# Patient Record
Sex: FEMALE | Race: BLACK OR AFRICAN AMERICAN | Employment: FULL TIME | URBAN - METROPOLITAN AREA
[De-identification: names, ages, dates, MRNs, and addresses within clinical notes are randomized per-mention and may not be internally consistent; named-entity substitution may affect disease eponyms.]

---

## 2021-02-04 ENCOUNTER — OFFICE VISIT (OUTPATIENT)
Dept: SURGERY | Age: 41
End: 2021-02-04
Payer: COMMERCIAL

## 2021-02-04 VITALS
TEMPERATURE: 97.7 F | SYSTOLIC BLOOD PRESSURE: 134 MMHG | WEIGHT: 293 LBS | OXYGEN SATURATION: 100 % | BODY MASS INDEX: 47.09 KG/M2 | HEART RATE: 97 BPM | HEIGHT: 66 IN | DIASTOLIC BLOOD PRESSURE: 85 MMHG

## 2021-02-04 DIAGNOSIS — E66.01 MORBID OBESITY WITH BMI OF 50.0-59.9, ADULT (HCC): Primary | ICD-10-CM

## 2021-02-04 PROCEDURE — 99205 OFFICE O/P NEW HI 60 MIN: CPT | Performed by: SURGERY

## 2021-02-04 RX ORDER — TRAZODONE HYDROCHLORIDE 100 MG/1
TABLET ORAL
COMMUNITY
Start: 2020-11-22

## 2021-02-04 RX ORDER — LINACLOTIDE 145 UG/1
CAPSULE, GELATIN COATED ORAL
COMMUNITY
Start: 2020-12-03 | End: 2021-08-05

## 2021-02-04 RX ORDER — ARIPIPRAZOLE 15 MG/1
TABLET ORAL
COMMUNITY
Start: 2021-02-01

## 2021-02-04 NOTE — PROGRESS NOTES
Consult    Patient: Steven Jin MRN: [id number]  SSN: [id number]    YOB: 1980  Age: 41 y.o. Sex: female      Initial  Consultation for Bariatric Surgery     Steven Jin is a 73-year-old black female who presents for discussion of surgical options available for the definitive management of her super obesity. Onset obesity: Childhood  Weight 16: 170 pounds and a 5 foot 6 inch frame. Maximum/current weight: 325 pounds and a 5 feet 6 inches frame with a body mass index of 52. Pattern/progression of weight gain: Slowly progressive interrupted by dietary weight loss followed by regain of lost weight as well as additional weight thus exhibiting the yoyo effect of her current maximum weight of 325 pounds. Max medical weight loss attempts: Multiple unsupervised and supervised weight loss trials with a maximal loss occurring in  losing 30 pounds over 8 months  Comorbidities: Stress urinary incontinence, CPAP dependent obstructive sleep apnea, with weight related arthropathy-knees, ankles  Current weight 325 BMI: 52  Ideal body weight: 137  Excess body weight: 188  Estimated postsurgical weight loss: 150  Estimated postsurgical goal weight: 175  Allergies: No known drug allergies  Current medications: See medication list  Past medical history:  1. Super obesity with body mass index of 52 and obesity comorbidities of stress urinary incontinence, CPAP and obstructive sleep apnea, weight related arthropathy-knees, ankles  2. Bipolar disease  Past surgical history:  1.  section   2. Bilateral breast reduction mammoplasty   Social history:  Tobacco: None  Alcohol: 1 to 2 ounces on a monthly basis  Family history:   Mother 60-clinically severe obesity with adult onset diabetes mellitus and hypertension  Father 62-hypertension  Brother 38-healthy    Not on File    Current Outpatient Medications on File Prior to Visit   Medication Sig Dispense Refill    venlafaxine-SR (EFFEXOR-XR) 150 mg capsule TAKE 2 CAPSULES BY MOUTH EVERY DAY IN THE MORNING      ARIPiprazole (ABILIFY) 15 mg tablet       Linzess 145 mcg cap capsule TAKE 1 CAPSULE BY MOUTH ONCE A DAY BEFORE MEALS      traZODone (DESYREL) 100 mg tablet TAKE 2 TABLET BY MOUTH EVERY NIGHT AS NEEDED FOR SLEEP. No current facility-administered medications on file prior to visit. Past Medical History:   Diagnosis Date    Depression     Fibroids     IBS (irritable bowel syndrome)     Sleep apnea        History reviewed. No pertinent surgical history. Social History     Tobacco Use    Smoking status: Never Smoker    Smokeless tobacco: Never Used   Substance Use Topics    Alcohol use: Yes     Comment: socially    Drug use: Never       Family History   Problem Relation Age of Onset    Diabetes Mother     Hypertension Mother     Hypertension Father          Review of Systems:      General: Denies fevers, chills, night sweats, fatigue, weight loss, or weight gain.     HEENT: Denies changes in auditory or visual acuity, recurrent pharyngitis, epistaxis, chronic rhinorrhea, vertigo    Respiratory: Denies increasing shortness of breath, productive cough, hemoptysis    Cardiac: Denies known history of cardiac disease, heart murmur, palpitations    GI: Denies dysphagia, recurrent emesis, hematemesis, changes in bowel habits, hematochezia, melena    : Denies hematuria frequency urgency dysuria    Musculoskeletal: Denies fractures, dislocations    Neurologic: Denies history of CVA, paralysis paresthesias, recurrent cephalgia, seizures    Endocrine: Denies polyuria, polydipsia, polyphagia, heat and cold intolerance    Lymph/heme: Denies a history of malignancy, anemia, bruising, blood transfusions    Integumentary: Negative for dermatitis         Physical Exam    Visit Vitals  /85 (BP 1 Location: Right upper arm, BP Patient Position: Sitting)   Pulse 97   Temp 97.7 °F (36.5 °C)   Ht 5' 6\" (1.676 m)   Wt 147.4 kg (325 lb)   LMP 02/01/2021 (Exact Date)   SpO2 100%   BMI 52.46 kg/m²       Nursing note reviewed. General: Clinically severely obese in no acute distress, nontoxic in appearance. Head: Normocephalic, atraumatic  Mouth: Clear, no overt lesions, oral mucosa is pink and moist.  Neck: Supple, no masses, no adenopathy or carotid bruits, trachea midline  Resp: Clear to auscultation bilaterally, no wheezing, rhonchi, or rales, excursions normal and symmetrical.  Cardio: Regular rate and rhythm, no murmurs, clicks, gallops, or rubs. Abdomen: Obese, soft, nontender, nondistended, normoactive bowel sounds, no hernias. Extremities: Warm, well perfused, no tenderness or swelling, normal gait/station, without edema or varicosities  Neuro: Sensation and strength grossly intact and symmetrical.  Psych: Alert and oriented to person, place, and time. Impression/Plan:      68-year-old black female with a body mass index of 52 and obesity related comorbidities of stress incontinence, CPAP treatment obstructive sleep apnea, weight related arthropathy-knees, ankles who would benefit from bariatric surgery. We have had an extensive discussion with regard to the risks, benefits and likely outcomes of the operation. We've discussed the restrictive and malabsorptive nature of the gastric bypass and compared and contrasted with the sleeve gastrectomy. The patient understands the likelihood of losing approximately 80% of their excess weight in 12 to 18 months. The patient also understands the risks including but not limited to bleeding, infection, need for reoperation, ulcers, leaks and strictures, bowel obstruction secondary to adhesions and internal hernias, DVT, PE, heart attack, stroke, and death. Patient also understands risks of inadequate weight loss, excess weight loss, vitamin insufficiency, protein malnutrition, excess skin, and loss of hair.   We have reviewed the components of a successful postoperative course including requirement for a high protein, low carbohydrate diet, 60 oz a day of zero calorie liquids, daily vitamin supplementation, daily exercise, regular follow-up, and participation in support groups.  At this time we will enroll the patient in our bariatric program, undertake routine laboratory evaluation, chest X-ray, EKG, possible UGI and evaluation by  nutritionist as well as psychologist and pending their satisfactory completion of the preop evaluation, plan to pursue laparoscopic potentially open gastric bypass to achieve definitive durable weight loss on a personal level with expected resolution of obesity related comorbidities

## 2021-02-04 NOTE — PROGRESS NOTES
Og Bowling is a 41 y.o. female (: 1980) presenting to address:    Chief Complaint   Patient presents with   [address] [facility] Patient     GBP Consult       Medication list and allergies have been reviewed with Og Bowling and updated as of today's date. I have gone over all Medical, Surgical and Social History with Og Bowling and updated/added the information accordingly.

## 2021-02-08 ENCOUNTER — HOSPITAL ENCOUNTER (OUTPATIENT)
Dept: BARIATRICS/WEIGHT MGMT | Age: 41
Discharge: HOME | End: 2021-02-08

## 2021-02-08 NOTE — PROGRESS NOTES
[facility]  [facility], [address]    Patient's Name: Brandi Whaley   Age: 41 y.o. YOB: 1980   Sex: female    Date:   2/8/2021    Session: 1 of  6  Surgeon:  Negra Sanders    Height: 66\" Weight:    326  Lbs   BMI: 52.6    Starting Weight: 325 lbs     Overall Pounds Lost: 0   Overall Pounds Gained: 1      Do you smoke? Patient does not smoke    Alcohol intake: yes  Number of drinks at a time:  1  Number of times a week: 0x/week    Class Guidelines    Guidelines are reviewed with patient at the start of every class. 1. Patient understands that weight loss trial classes must be consecutive. Patient understands if they miss a class, it is their responsibility to contact me to reschedule class. I will reach out to patient after their first no show. 2.  Patient understands the expectations that weight maintenance/weight loss is expected during the classes. Failure to demonstrate changes may result in one extra month of weight loss trial, followed by going back to see the surgeon. 3. Patient is also instructed to be doing their labs, blood work, psych visit, support group and any other test that the surgeon has used while they are working on their weight loss trial.    Changes Made: Drinking more water and focusing on 75 g of carbs and exercise      Dietary Instruction    During today's class we continued to focus on the key diet principles. Patient was instructed to follow a low carbohydrate diet, focusing on meat and vegetables. Patient was instructed to stop liquid calories and aim for 64 ounces of water per day. We focused on focusing in on bigger problem areas to start making changes to, such as reducing fast food intake, reducing carbonated beverages/soda intake, decreasing carbohydrates intake daily, etc. We reviewed protein shakes and high protein yogurts to chose, as well.  Patient was educated on low sugar swaps for beverages, as well as low carb swaps for every day high carb meals. Patient's diet habits include: Eating 3-4 meals/day, consisting of protein and vegetables. Carb source comes from starchy veggies and yogurt or fruit. Snacking 2x/day on eggs. Struggling to let go of sweet tea. Drinking 64 oz of water daily. Physical Activity/Exercise    Comments:     Currently for exercise, patient is walking or going to the gym 4x/week for 30 minutes. We talked about activities for patient to do, including walking, swimming, or chair exercises, as well as some additional steps to take in the day to get extra movement, such as parking further away, walking dog, taking stairs vs elevator, etc.        Behavior Modification  Comments:   During class, I reviewed a power point with patients called, \"How to Read a Nutrition Label and Understanding Carbohydrates. \" During this power point, the patient was educated on reading nutrition labels and ingredients list and how to find low sugar/low fat products when shopping, as well as recognizing different sources of carbohydrates in foods. The patient was encouraged to try low carb food swaps that were discussed in the power point. We explored various ways of preparing and cooking low carb meals to help reach the goal of decreasing daily carbohydrate consumption. Patient's S.M.A.R.T. Goals are:  1. Aim for 75 g of carbs daily  2. Work out with [employer]  3.  Find alternatives to move away from eating when bored      Jos Clay, PARVEZ  2/8/2021

## 2021-02-16 ENCOUNTER — HOSPITAL ENCOUNTER (OUTPATIENT)
Dept: GENERAL RADIOLOGY | Age: 41
Discharge: HOME | End: 2021-02-16
Payer: COMMERCIAL

## 2021-02-16 ENCOUNTER — HOSPITAL ENCOUNTER (OUTPATIENT)
Dept: PREADMISSION TESTING | Age: 41
Discharge: HOME | End: 2021-02-16
Payer: COMMERCIAL

## 2021-02-16 ENCOUNTER — TRANSCRIBE ORDER (OUTPATIENT)
Dept: REGISTRATION | Age: 41
End: 2021-02-16

## 2021-02-16 ENCOUNTER — HOSPITAL ENCOUNTER (OUTPATIENT)
Dept: LAB | Age: 41
Discharge: HOME | End: 2021-02-16
Payer: COMMERCIAL

## 2021-02-16 DIAGNOSIS — E66.01 MORBID OBESITY (HCC): Primary | ICD-10-CM

## 2021-02-16 DIAGNOSIS — E66.01 MORBID OBESITY WITH BMI OF 50.0-59.9, ADULT (HCC): ICD-10-CM

## 2021-02-16 DIAGNOSIS — E66.01 MORBID OBESITY (HCC): ICD-10-CM

## 2021-02-16 LAB
25(OH)D3 SERPL-MCNC: 23.4 NG/ML (ref 30–100)
ALBUMIN SERPL-MCNC: 3.2 G/DL (ref 3.4–5)
ALBUMIN/GLOB SERPL: 0.7 {RATIO} (ref 0.8–1.7)
ALP SERPL-CCNC: 88 U/L (ref 45–117)
ALT SERPL-CCNC: 53 U/L (ref 13–56)
AMORPH CRY URNS QL MICRO: ABNORMAL
ANION GAP SERPL CALC-SCNC: 3 MMOL/L (ref 3–18)
APPEARANCE UR: ABNORMAL
AST SERPL-CCNC: 35 U/L (ref 10–38)
BACTERIA URNS QL MICRO: NEGATIVE /HPF
BILIRUB SERPL-MCNC: 0.3 MG/DL (ref 0.2–1)
BILIRUB UR QL: NEGATIVE
BUN SERPL-MCNC: 10 MG/DL (ref 7–18)
BUN/CREAT SERPL: 12 (ref 12–20)
CALCIUM SERPL-MCNC: 8.8 MG/DL (ref 8.5–10.1)
CHLORIDE SERPL-SCNC: 104 MMOL/L (ref 100–111)
CHOLEST SERPL-MCNC: 158 MG/DL
CO2 SERPL-SCNC: 29 MMOL/L (ref 21–32)
COLOR UR: YELLOW
CREAT SERPL-MCNC: 0.84 MG/DL (ref 0.6–1.3)
EPITH CASTS URNS QL MICRO: ABNORMAL /LPF (ref 0–5)
ERYTHROCYTE [DISTWIDTH] IN BLOOD BY AUTOMATED COUNT: 20.1 % (ref 11.6–14.5)
FOLATE SERPL-MCNC: 13.9 NG/ML (ref 3.1–17.5)
GLOBULIN SER CALC-MCNC: 4.8 G/DL (ref 2–4)
GLUCOSE SERPL-MCNC: 90 MG/DL (ref 74–99)
GLUCOSE UR STRIP.AUTO-MCNC: NEGATIVE MG/DL
HCT VFR BLD AUTO: 28.2 % (ref 35–45)
HDLC SERPL-MCNC: 45 MG/DL (ref 40–60)
HDLC SERPL: 3.5 {RATIO} (ref 0–5)
HGB BLD-MCNC: 7.4 G/DL (ref 12–16)
HGB UR QL STRIP: NEGATIVE
IRON SERPL-MCNC: 16 UG/DL (ref 50–175)
KETONES UR QL STRIP.AUTO: NEGATIVE MG/DL
LDLC SERPL CALC-MCNC: 90.2 MG/DL (ref 0–100)
LEUKOCYTE ESTERASE UR QL STRIP.AUTO: NEGATIVE
LIPID PROFILE,FLP: NORMAL
MCH RBC QN AUTO: 17.1 PG (ref 24–34)
MCHC RBC AUTO-ENTMCNC: 26.2 G/DL (ref 31–37)
MCV RBC AUTO: 65 FL (ref 74–97)
NITRITE UR QL STRIP.AUTO: NEGATIVE
PH UR STRIP: 5.5 [PH] (ref 5–8)
PLATELET # BLD AUTO: 317 K/UL (ref 135–420)
POTASSIUM SERPL-SCNC: 4.2 MMOL/L (ref 3.5–5.5)
PROT SERPL-MCNC: 8 G/DL (ref 6.4–8.2)
PROT UR STRIP-MCNC: NEGATIVE MG/DL
RBC # BLD AUTO: 4.34 M/UL (ref 4.2–5.3)
RBC #/AREA URNS HPF: NEGATIVE /HPF (ref 0–5)
SODIUM SERPL-SCNC: 136 MMOL/L (ref 136–145)
SP GR UR REFRACTOMETRY: 1.02 (ref 1–1.03)
TRIGL SERPL-MCNC: 114 MG/DL (ref ?–150)
TSH SERPL DL<=0.05 MIU/L-ACNC: 5.51 UIU/ML (ref 0.36–3.74)
UROBILINOGEN UR QL STRIP.AUTO: 0.2 EU/DL (ref 0.2–1)
VIT B12 SERPL-MCNC: 534 PG/ML (ref 211–911)
VLDLC SERPL CALC-MCNC: 22.8 MG/DL
WBC # BLD AUTO: 6.5 K/UL (ref 4.6–13.2)
WBC URNS QL MICRO: ABNORMAL /HPF (ref 0–4)

## 2021-02-16 PROCEDURE — 85027 COMPLETE CBC AUTOMATED: CPT

## 2021-02-16 PROCEDURE — 80061 LIPID PANEL: CPT

## 2021-02-16 PROCEDURE — 82306 VITAMIN D 25 HYDROXY: CPT

## 2021-02-16 PROCEDURE — 82607 VITAMIN B-12: CPT

## 2021-02-16 PROCEDURE — 36415 COLL VENOUS BLD VENIPUNCTURE: CPT

## 2021-02-16 PROCEDURE — 84425 ASSAY OF VITAMIN B-1: CPT

## 2021-02-16 PROCEDURE — 81001 URINALYSIS AUTO W/SCOPE: CPT

## 2021-02-16 PROCEDURE — 71046 X-RAY EXAM CHEST 2 VIEWS: CPT

## 2021-02-16 PROCEDURE — 80053 COMPREHEN METABOLIC PANEL: CPT

## 2021-02-16 PROCEDURE — 93005 ELECTROCARDIOGRAM TRACING: CPT

## 2021-02-16 PROCEDURE — 83013 H PYLORI (C-13) BREATH: CPT

## 2021-02-16 PROCEDURE — 84443 ASSAY THYROID STIM HORMONE: CPT

## 2021-02-16 PROCEDURE — 83540 ASSAY OF IRON: CPT

## 2021-02-17 LAB
ATRIAL RATE: 80 BPM
CALCULATED P AXIS, ECG09: 66 DEGREES
CALCULATED R AXIS, ECG10: 50 DEGREES
CALCULATED T AXIS, ECG11: 27 DEGREES
DIAGNOSIS, 93000: NORMAL
P-R INTERVAL, ECG05: 158 MS
Q-T INTERVAL, ECG07: 354 MS
QRS DURATION, ECG06: 84 MS
QTC CALCULATION (BEZET), ECG08: 408 MS
UREA BREATH TEST QL: NEGATIVE
VENTRICULAR RATE, ECG03: 80 BPM

## 2021-02-19 LAB — VIT B1 BLD-SCNC: 121 NMOL/L (ref 66.5–200)

## 2021-02-26 ENCOUNTER — TELEPHONE (OUTPATIENT)
Dept: SURGERY | Age: 41
End: 2021-02-26

## 2021-02-26 DIAGNOSIS — Z01.818 PREOPERATIVE EXAMINATION: ICD-10-CM

## 2021-02-26 DIAGNOSIS — Z01.818 PREOPERATIVE EXAMINATION: Primary | ICD-10-CM

## 2021-02-26 NOTE — TELEPHONE ENCOUNTER
Advised patient to begin taking vitamin D3 5,000 iu, vitamin C 500 mg, and Iron 65 mg. Also encouraged pt to increase protein and repeat CBC and albumin level before preop appointment. Faxed TSH results to primary care office. ----- Message from Talya Garcia DO sent at 2/16/2021  4:51 PM EST -----  Needs supplemental iron/vitamin C, supplemental vitamin D. Needs to increase oral protein. Forward TSH to primary care physician for review and treatment.   Will need a repeat CBC, and albumin level prior to surgical intervention-previous to preop evaluation

## 2021-03-10 ENCOUNTER — HOSPITAL ENCOUNTER (OUTPATIENT)
Dept: BARIATRICS/WEIGHT MGMT | Age: 41
Discharge: HOME | End: 2021-03-10

## 2021-03-10 NOTE — PROGRESS NOTES
[facility]  [facility], [address]    Patient's Name: Raleigh Bonds   Age: 41 y.o. YOB: 1980   Sex: female    Date:   3/10/2021    Session: 2 of  6  Surgeon:  Aden Cochran    Height: 66\" Weight:    318      Lbs  BMI: 51.3    Starting Weight: 325 lbs     Overall Pounds Lost: 7   Overall Pounds Gained: 0      Do you smoke? Patient does not smoke  Alcohol intake: Patient does not drink. Class Guidelines    Guidelines are reviewed with patient at the start of every class. 1. Patient understands that weight loss trial classes must be consecutive. Patient understands if they miss a class, it is their responsibility to contact me to reschedule class. I will reach out to patient after their first no show. 2.  Patient understands the expectations that weight maintenance/weight loss is expected during the classes. Failure to demonstrate changes may result in one extra month of weight loss trial, followed by going back to see the surgeon. 3. Patient is also instructed to be doing their labs, blood work, psych visit, support group and any other test that the surgeon has used while they are working on their weight loss trial.    Changes Made: drinking more water and focusing on 75g of carbs and added exercise      Dietary Instruction    During today's class, we continued to focus on the key diet principles. Patient was instructed to follow a low carbohydrate diet, focusing on meat and vegetables. Patient was instructed to stop liquid calories and aim for 64 ounces of water per day. We focused on focusing in on bigger problem areas to start making changes to, such as reducing fast food intake, reducing carbonated beverages/soda intake, decreasing carbohydrates intake daily, etc. We reviewed protein shakes and high protein yogurts to chose, as well. Patient was educated on good breakfast ideas and high protein snacks.     I also reviewed with patient the vitamins that they will need to take post op. Patient will hear this information again at pre op class prior to surgery, but I felt it was important to prepare them now. Patient will be taking 2 multivitamin complete per day, 100 mg of Vitamin B1, 5000 IU of Vitamin D3, 1000 mcg Vitamin B12, 1500 mg of calcium citrate. Patient's diet habits include: Eating 3-4 meals daily, consisting of protein and veggies. Carb sources come from cheese alexx, dairy and fruit. Snacking 1x/day on yogurt, or keto ice cream or protein shakes or low carb cheez its. Struggling to completely let go of ice cream. Drinking 84 oz of water daily. Physical Activity/Exercise    Comments:     Currently for exercise, patient is walking 3x/week for 30 minutes. We talked about activities for patient to do, including walking, swimming, or chair exercises, as well as some additional steps to take in the day to get extra movement, such as parking further away, walking dog, taking stairs vs elevator, etc. Patient was encouraged to start up an exercise routine and build on it. Behavior Modification  Comments:   Emphasized the importance of eating slowly, not eating and drinking meals at the same time. Discussed Key Behavior principles to start implementing to be successful following surgery, such as, importance of 3 meals daily, keeping a food journal, avoid distractions during meal times, and chewing food thoroughly, as a few examples. Patient's S.M.A.R.T. Goals are:  1. Keep at 75g of carbs  2. Work out with [employer]  3.  Focus on spacing out the meal for 20 minutes      Dean Ramirez, PARVEZ  3/10/2021

## 2021-03-25 ENCOUNTER — HOSPITAL ENCOUNTER (OUTPATIENT)
Dept: LAB | Age: 41
Discharge: HOME | End: 2021-03-25

## 2021-03-25 LAB — XX-LABCORP SPECIMEN COL,LCBCF: NORMAL

## 2021-03-25 PROCEDURE — 99001 SPECIMEN HANDLING PT-LAB: CPT

## 2021-03-26 LAB
ALBUMIN SERPL-MCNC: 3.5 G/DL (ref 3.8–4.8)
BASOPHILS # BLD AUTO: 0 X10E3/UL (ref 0–0.2)
BASOPHILS NFR BLD AUTO: 0 %
EOSINOPHIL # BLD AUTO: 0.2 X10E3/UL (ref 0–0.4)
EOSINOPHIL NFR BLD AUTO: 3 %
ERYTHROCYTE [DISTWIDTH] IN BLOOD BY AUTOMATED COUNT: 28 % (ref 11.7–15.4)
HCT VFR BLD AUTO: 31.6 % (ref 34–46.6)
HGB BLD-MCNC: 9.1 G/DL (ref 11.1–15.9)
IMM GRANULOCYTES # BLD AUTO: 0 X10E3/UL (ref 0–0.1)
IMM GRANULOCYTES NFR BLD AUTO: 0 %
LYMPHOCYTES # BLD AUTO: 1.4 X10E3/UL (ref 0.7–3.1)
LYMPHOCYTES NFR BLD AUTO: 25 %
MCH RBC QN AUTO: 20.5 PG (ref 26.6–33)
MCHC RBC AUTO-ENTMCNC: 28.8 G/DL (ref 31.5–35.7)
MCV RBC AUTO: 71 FL (ref 79–97)
MONOCYTES # BLD AUTO: 0.5 X10E3/UL (ref 0.1–0.9)
MONOCYTES NFR BLD AUTO: 9 %
MORPHOLOGY BLD-IMP: ABNORMAL
NEUTROPHILS # BLD AUTO: 3.5 X10E3/UL (ref 1.4–7)
NEUTROPHILS NFR BLD AUTO: 63 %
PLATELET # BLD AUTO: 345 X10E3/UL (ref 150–450)
RBC # BLD AUTO: 4.43 X10E6/UL (ref 3.77–5.28)
SPECIMEN STATUS REPORT, ROLRST: NORMAL
WBC # BLD AUTO: 5.5 X10E3/UL (ref 3.4–10.8)

## 2021-03-29 ENCOUNTER — TELEPHONE (OUTPATIENT)
Dept: SURGERY | Age: 41
End: 2021-03-29

## 2021-04-07 ENCOUNTER — HOSPITAL ENCOUNTER (OUTPATIENT)
Dept: BARIATRICS/WEIGHT MGMT | Age: 41
Discharge: HOME | End: 2021-04-07

## 2021-04-07 NOTE — PROGRESS NOTES
[facility]  [facility], [address]    Patient's Name: Francis Harrington   Age: 41 y.o. YOB: 1980   Sex: female    Date:   4/7/2021    Session: 3 of  6  Surgeon:  Evie Bradley    Height: 66\" Weight:    311      Lbs  BMI: 50.1     Previous Month's Weight: 318  Pounds Lost since last month: 7                 Pounds Gained since last month: 0    Starting Weight: 325     Overall Pounds Lost: 14   Overall Pounds Gained: 0      Do you smoke? Patient does not smoke    Alcohol intake: Pt does not drink  Number of drinks at a time:  0  Number of times a week: 0    Class Guidelines    Guidelines are reviewed with patient at the start of every class. 1. Patient understands that weight loss trial classes must be consecutive. Patient understands if they miss a class, it is their responsibility to contact me to reschedule class. I will reach out to patient after their first no show. 2.  Patient understands the expectations that weight maintenance/weight loss is expected during the classes. Failure to demonstrate changes may result in one extra month of weight loss trial, followed by going back to see the surgeon. 3. Patient is also instructed to be doing their labs, blood work, psych visit, support group and any other test that the surgeon has used while they are working on their weight loss trial.    Changes Made: drinking more protein and focus on low carb      Dietary Instruction    During today's class, we continued to focus on the key diet principles. Patient was instructed to follow a low carbohydrate diet, focusing on meat and vegetables. Patient was instructed to stop liquid calories and aim for 64 ounces of water per day.  We focused on focusing in on bigger problem areas to start making changes to, such as reducing fast food intake, reducing carbonated beverages/soda intake, decreasing carbohydrates intake daily, etc. We reviewed protein shakes and high protein yogurts to chose, as well. Patient was educated on good breakfast ideas and high protein snacks. I also reviewed with patient the post-op diet. Patient will hear this information again at pre op class prior to surgery, but I felt it was important to prepare them now. We covered the 7 day Pre-Op liquid diet prior to surgery, as well as the 7 day post-op liquid diet to get the body adjusted to their new pouch and \"jumpstart\" weight loss. We discussed portion sizes, focus on protein foods for the 1st 6 weeks post-op and approved SF fluids to consume. Patient understanded the importance of following this diet post op as to not regain weight and go back to old habits, and be most successful w/ their weight loss journey. Patient understands that surgery is only a tool in weight loss. Patient's diet habits include: Eating 3 meals daily, consisting of protein and veggie, protein being the biggest portion. Carb sources come from keto ice cream, shakes, and dairy. Snacking 1x/day on yogurt. Struggling to completely let go of ice cream. Drinking 64 oz of water daily. Physical Activity/Exercise    Comments:     Currently for exercise, patient is walking or going to the gym 2-3x/week. We talked about activities for patient to do, including walking, swimming, or chair exercises, as well as some additional steps to take in the day to get extra movement, such as parking further away, walking dog, taking stairs vs elevator, etc. Patient was encouraged to start up an exercise routine and build on it. Behavior Modification  Comments:   Emphasized the importance of eating slowly, not eating and drinking meals at the same time. Discussed Key Behavior principles to start implementing to be successful following surgery, such as, importance of 3 meals daily, keeping a food journal, avoid distractions during meal times, and chewing food thoroughly, taking 20-30 minutes to eat a meal, as a few examples. Patient understands the importance of following through with these behaviors following surgery to aid in long term weight loss. Patient's S.M.A.R.T. Goals are:  1. Keep at 50 g of carbs  2. Work out with [employer]  3. Focus on eating for 20 minutes    Patient has not attended a support group meeting.       Joseph Sales, PARVEZ  4/7/2021

## 2021-05-05 ENCOUNTER — HOSPITAL ENCOUNTER (OUTPATIENT)
Dept: BARIATRICS/WEIGHT MGMT | Age: 41
Discharge: HOME | End: 2021-05-05

## 2021-05-05 NOTE — PROGRESS NOTES
[facility]  [facility], [address]    Patient's Name: Russell Serra   Age: 41 y.o. YOB: 1980   Sex: female    Date:   5/5/2021    Session: 4 of  6  Surgeon:  Cathy Lee    Height: 66\" Weight:    312      Lbs  BMI: 50.3     Previous Month's Weight: 311  Pounds Lost since last month: 0                Pounds Gained since last month: 1    Starting Weight: 325     Overall Pounds Lost: 13   Overall Pounds Gained: 0      Do you smoke? Pt does not smoke    Alcohol intake: Pt does not drink  Number of drinks at a time:  0  Number of times a week: 0    Class Guidelines    Guidelines are reviewed with patient at the start of every class. 1. Patient understands that weight loss trial classes must be consecutive. Patient understands if they miss a class, it is their responsibility to contact me to reschedule class. I will reach out to patient after their first no show. 2.  Patient understands the expectations that weight maintenance/weight loss is expected during the classes. Failure to demonstrate changes may result in one extra month of weight loss trial, followed by going back to see the surgeon. 3. Patient is also instructed to be doing their labs, blood work, psych visit, support group and any other test that the surgeon has used while they are working on their weight loss trial.    Changes Made: Focusing on more protein      Dietary Instruction    During today's class, we continued to focus on the key diet principles. Patient was instructed to follow a low carbohydrate diet, focusing on meat and vegetables. Patient was instructed to stop liquid calories and aim for 64 ounces of water per day.  We focused on focusing in on bigger problem areas to start making changes to, such as reducing fast food intake, reducing carbonated beverages/soda intake, decreasing carbohydrates intake daily, etc. We reviewed protein shakes and high protein yogurts to chose, as well. Patient was educated on good breakfast ideas and high protein snacks. I also reviewed with patient tips on practicing portion control. We covered various portion sizes for protein, starches, veggies, and desserts. Patient was educated on the importance of keeping a food journal/food tracking, not skipping meals, eating slowly, portioning out snacks, and choices to make when dining out or at fast food spots. Tips included taking 20 minutes to eat a meal, portion snacks out into a bowl or bag, portion control guide, using smaller plates and utensils, and splitting meals when dining out. Patient understands that surgery is only a tool in weight loss and incorporating portion control will help patient be most successful with long term weight loss. Patient's diet habits include: Eating 3 meals daily, consisting of protein and veggie. Biggest portion: protein. Carbs: keto ice cream, cheez-its. Snacking 1x/day on low carb cheez-its. Struggling to let go of ice cream completely. Drinking 64 oz of water daily. Physical Activity/Exercise    Comments:     Currently for exercise, patient is walking inconsistently - no specific regimen. We talked about activities for patient to do, including walking, swimming, or chair exercises, as well as some additional steps to take in the day to get extra movement, such as parking further away, walking dog, taking stairs vs elevator, etc. Patient was encouraged to start up an exercise routine and build on it. Behavior Modification  Comments:   Emphasized the importance of eating slowly, not eating and drinking meals at the same time. Discussed Key Behavior principles to start implementing to be successful following surgery, such as, importance of 3 meals daily, keeping a food journal, avoid distractions during meal times, and chewing food thoroughly, taking 20-30 minutes to eat a meal, as a few examples.  Patient understands the importance of following through with these behaviors following surgery to aid in long term weight loss. Tips and advice were given on how to start implementing these into the patient's life. Patient's S.M.A.R.T. Goals are:  1. Keep at 50g carbs  2. Workout w/ weights at least 3x/week  3. Focus on taking 20 minutes to eat a meal.    Patient has attended a support group meeting.       Carnell Severin, RD  5/5/2021

## 2021-06-02 ENCOUNTER — HOSPITAL ENCOUNTER (OUTPATIENT)
Dept: BARIATRICS/WEIGHT MGMT | Age: 41
Discharge: HOME | End: 2021-06-02

## 2021-06-02 NOTE — PROGRESS NOTES
[facility]  [facility], [address]    Patient's Name: Placido Molina   Age: 41 y.o. YOB: 1980   Sex: female    Date:   6/2/2021    Session: 5 of  6  Surgeon:  Dinorah Jensen    Height: 66\" Weight:    309      Lbs  BMI: 49.8     Previous Month's Weight: 312  Pounds Lost since last month: 3                 Pounds Gained since last month: 0    Starting Weight: 325     Overall Pounds Lost: 16   Overall Pounds Gained: 0      Do you smoke? Pt does not smoke    Alcohol intake: Pt does not drink  Number of drinks at a time:  0  Number of times a week: 0    Class Guidelines    Guidelines are reviewed with patient at the start of every class. 1. Patient understands that weight loss trial classes must be consecutive. Patient understands if they miss a class, it is their responsibility to contact me to reschedule class. I will reach out to patient after their first no show. 2.  Patient understands the expectations that weight maintenance/weight loss is expected during the classes. Failure to demonstrate changes may result in one extra month of weight loss trial, followed by going back to see the surgeon. 3. Patient is also instructed to be doing their labs, blood work, psych visit, support group and any other test that the surgeon has used while they are working on their weight loss trial.    Changes Made: Drinking more protein shakes and focusing of 50g carbs; exercising 4x/week      Dietary Instruction    During today's class, we continued to focus on the key diet principles. Patient was instructed to follow a low carbohydrate diet, focusing on meat and vegetables. Patient was instructed to stop liquid calories and aim for 64 ounces of water per day.  We focused on focusing in on bigger problem areas to start making changes to, such as reducing fast food intake, reducing carbonated beverages/soda intake, decreasing carbohydrates intake daily, etc. We reviewed protein shakes and high protein yogurts to chose, as well. We also reviewed some busted some myths associated with Bariatric surgery during today's webinar. The myths we covered include:  1. Anyone is eligible for surgery - There is a list of criteria and must show the interdisciplinary team that you are actively making changes with diet, exercise and food behaviors. 2. Bariatric Surgery is taking the \"easy way out\" -- Surgery is just a tool. Habits must be changed and this is a life-long process. Surgery can be viewed as a jumpstart. 3. After getting surgery, you can go back to your old eating habits -- Your body will most likely not allow this, due to symptoms such as dumping syndrome, nausea, and vomiting that may come about when eating the wrong foods or amounts. 4. Bariatric Surgery can lead to a transfer of addictions -- Addictions may come about for multiple reasons. It may be important to speak with a mental health professional if you have a family history of addictions. 5. Patients will have nutritional deficiencies after surgery -- As long as patients are taking all recommended vitamins daily, deficiencies are not likely. 6. I will be able to follow a Keto diet or Intermittent fasting -- We stress the importance of following a diet that is high in protein and low in fat and sugars/carbs after surgery, as well as ensuring consistent protein intake throughout the day to keep blood sugars controlled. 7. I am going to constantly be hungry after surgery -- The portion sizes after sugery may seem small to patients prior to surgery, however, the small portions will be more than enough to keep you full and satisfied at each meal.   8. I should not get pregnant after having bariatric surgery -- Pregnancy may actually be safer for both mom and baby after surgery. It is recommended to wait 18 months post-op to conceive.    Patient was educated on the bariatric process within nutrition, and how to avoid and weed through some inaccurate information and opinions that they may read on the Internet regarding bariatric surgery. Patient's dietary habits include: Eating 4 meals daily, consisting of eggs and other sources of meat - biggest portion of meals. Starches consumed: fruits and veggies; yogurt. Snacking 1x/day on yogurt. Struggling to let go of ice cream. Drinking 64 oz of water, crystal light, and protein drinks combined. Physical Activity/Exercise    Comments:     Currently for exercise, patient is walking, going to the gym or using free weights 4-5x/week. We talked about activities for patient to do, including walking, swimming, or chair exercises, as well as some additional steps to take in the day to get extra movement, such as parking further away, walking dog, taking stairs vs elevator, etc. Patient was encouraged to start up an exercise routine and build on it. Behavior Modification  Comments:   Emphasized the importance of eating slowly, not eating and drinking meals at the same time. Discussed Key Behavior principles to start implementing to be successful following surgery, such as, importance of 3 meals daily, keeping a food journal, avoid distractions during meal times, and chewing food thoroughly, taking 20-30 minutes to eat a meal, as a few examples. Patient understands the importance of following through with these behaviors following surgery to aid in long term weight loss. Tips and advice were given on how to start implementing these into the patient's life. Patient's S.M.A.R.T. Goals are:  1. Eating 50g of carbs  2. Work out with more weights  3. Focus on eating for 20 minutes     Patient has attended a support group meeting.       Clemente Goyal RD  6/2/2021

## 2021-07-01 ENCOUNTER — HOSPITAL ENCOUNTER (OUTPATIENT)
Dept: BARIATRICS/WEIGHT MGMT | Age: 41
Discharge: HOME | End: 2021-07-01

## 2021-07-01 ENCOUNTER — DOCUMENTATION ONLY (OUTPATIENT)
Dept: BARIATRICS/WEIGHT MGMT | Age: 41
End: 2021-07-01

## 2021-07-01 NOTE — PROGRESS NOTES
Nutrition Evaluation    Patient's Name: Brendon Robertson   Age: 41 y.o. YOB: 1980   Sex: female    Height: 66\" Weight: 315.4 lbs office scale verified  BMI:  47.8  Starting Weight:  325 lbs        Smoking Status:  Pt does not smoke  Alcohol Intake:  Pt does not drink  Number of Drinks at a Time: 0  Number of Times a Week: 0    Changes made during classes include:  Protein shakes  Portion control  Low carbohydrate meals  High protein meals  Exercising consistently        Two things that patient learned during this weight loss trial:  Portion control tips  How the post-op diet will look like    Summary:  I feel that Brendon Robertson has demonstrated appropriate diet changes and is ready to move forward with surgery. Patient has been briefed on the importance of the protein drinks, vitamins, and the transition of the diet stages. Patient understands that the long-term diet will focus on protein and vegetables. Patient understand the effects of carbohydrates after surgery and what reactive hypoglycemia is. Patient is aware that they will be attending pre-op class 2 weeks before surgery and will get more detailed information on the post-op diet guidelines. Patient will see me again at 6 weeks post-op. At this 6 week visit, RD will assess how patient is tolerating soft protein and advance to vegetables, if tolerating soft protein without difficulty. Patient will also see RD again at 9 months post-op. This visit will assess patient's compliance with current protocol, including diet, vitamins, protein shakes, and exercise. Post-op diet guidelines will be reinforced. RD is available for questions and to meet with patient outside of the 6 week and 9 month post-op visit. We spent a lot of time talking about the vitamins.   Patient understands the importance of being compliant with the diet protocol and the complications and risks that can occur if they are non-compliant with the nutritional protocol. Patient has attended at least one support group. Patient has completed the [facility] Graduation Quiz to assess knowledge of post-op diet.        Candidate for surgery: Yes      Procedure:  Gastric Bypass     Payal Duong RD  7/1/2021

## 2021-07-15 ENCOUNTER — OFFICE VISIT (OUTPATIENT)
Dept: SURGERY | Age: 41
End: 2021-07-15
Payer: COMMERCIAL

## 2021-07-15 VITALS — BODY MASS INDEX: 47.09 KG/M2 | TEMPERATURE: 97.7 F | WEIGHT: 293 LBS | HEIGHT: 66 IN | RESPIRATION RATE: 20 BRPM

## 2021-07-15 DIAGNOSIS — E66.01 MORBID OBESITY WITH BMI OF 50.0-59.9, ADULT (HCC): Primary | ICD-10-CM

## 2021-07-15 PROCEDURE — 99214 OFFICE O/P EST MOD 30 MIN: CPT | Performed by: SURGERY

## 2021-07-15 NOTE — PROGRESS NOTES
Liberty Francis is a 41 y.o. female who presents today with   Chief Complaint   Patient presents with    Morbid Obesity     Pt presents today to discuss surgical options. Body mass index is 51.17 kg/m². 1. Have you been to the ER, urgent care clinic since your last visit? Hospitalized since your last visit? No    2. Have you seen or consulted any other health care providers outside of the [address] [place] [address] since your last visit? Include any pap smears or colon screening.  No

## 2021-07-15 NOTE — H&P (VIEW-ONLY)
Preop History and Physical Exam:    Rebeca Matamoros is a 41 y.o. black female initially evaluated in this office on February 4, 2021 for discussion of the surgical options available for definitive management of her super obesity. Patient at that time weighed 325 pounds with a body mass index of 52 with obesity related comorbidities of stress urinary incontinence, obstructive sleep apnea and weight related arthropathy. After discussing surgical options the patient elected pursue laparoscopic potentially open Sathya-en-Y gastric bypass to achieve definitive durable weight loss on a personal level expected resolution of obesity related comorbidities the patient returns today after completing the bariatric surgical multidisciplinary programmatic requirements necessary prior to the procedure of surgery for discussion of the diagnostic evaluation and surgical scheduling with the only new medical or surgical history noted being a laparoscopic hysterectomy in March 2021 for fibroids. The patient currently weighs 317 pounds and a 5 feet 6 inches frame with a body mass index of 51 with obesity related comorbidities of stress urinary incontinence, obstructive sleep apnea and weight related arthropathy. Ideal body weight 134 pounds, excess body weight 183 pounds, estimated postsurgical weight loss 146 pounds, postsurgical goal weight 171 pounds    Past Medical History:   Diagnosis Date    Depression     Fibroids     IBS (irritable bowel syndrome)     Sleep apnea        No past surgical history on file. Current Outpatient Medications   Medication Sig Dispense Refill    venlafaxine-SR (EFFEXOR-XR) 150 mg capsule TAKE 2 CAPSULES BY MOUTH EVERY DAY IN THE MORNING      ARIPiprazole (ABILIFY) 15 mg tablet       Linzess 145 mcg cap capsule TAKE 1 CAPSULE BY MOUTH ONCE A DAY BEFORE MEALS      traZODone (DESYREL) 100 mg tablet TAKE 2 TABLET BY MOUTH EVERY NIGHT AS NEEDED FOR SLEEP.          Not on File    Social History Tobacco Use    Smoking status: Never Smoker    Smokeless tobacco: Never Used   Substance Use Topics    Alcohol use: Yes     Comment: socially    Drug use: Never       Family History   Problem Relation Age of Onset    Diabetes Mother     Hypertension Mother     Hypertension Father        Review of Systems:  Positive in BOLD    CONST: Fever, weight loss, fatigue or chills  GI: Nausea, vomiting, abdominal pain, change in bowel habits, hematochezia, melena, and GERD   INTEG: Dermatitis, abnormal moles  HEENT: Recent changes in vision, vertigo, epistaxis, dysphagia and hoarseness  CV: Chest pain, palpitations, HTN, edema and varicosities  RESP: Cough, shortness of breath, wheezing, hemoptysis, snoring and reactive airway disease  : Hematuria, dysuria, frequency, urgency, nocturia and stress urinary incontinence   MS: Weakness, joint pain and arthritis  ENDO: Diabetes, thyroid disease, polyuria, polydipsia, polyphagia, poor wound healing, heat intolerance, cold intolerance  LYMPH/HEME: Anemia, bruising and history of blood transfusions  NEURO: Dizziness, headache, fainting, seizures and stroke  PSYCH: Anxiety and depression    Physical Exam    Visit Vitals  Temp 97.7 °F (36.5 °C) (Oral)   Resp 20   Ht 5' 6\" (1.676 m)   Wt 143.8 kg (317 lb)   BMI 51.17 kg/m²         General: Well developed, well nourished 41 y.o.) female in no acute distress  Head: Normocephalic, atraumatic  Resp: Clear to auscultation bilaterally, now wheeze, rhonchi, or rales, excursions normal and symmetrical  Cardio: Regular rate and rhythm, no murmurs, clicks, gallops, or rubs. No edema or varicosities. Abdomen: Obese, soft, nontender, nondistended, normoactive bowel sounds, no hernias, no hepatosplenomegaly  Psych: Alert and oriented to person, place, and time.     March 25, 2021 CBC, CMP, cholesterol panel, TSH, urinalysis, B1, B12, folate, iron, vitamin D, H. pylori: Albumin 3.5, hematocrit 31.6, hemoglobin 9.1, vitamin D 23.4, TSH 5.51, iron 16 with remainder laboratory profile being within normal limits patient was begun on supplemental vitamin D, iron/vitamin C and protein. The labs were submitted to her primary care physician for evaluation of the elevated TSH  Pap smear March 2021: No evidence of malignancy status post hysterectomy  February 2021: Chest x-rayno active cardiopulmonary disease  February 10, 2021 bilateral mammography: BI-RADS 2 with no evidence of malignancy  July 1, 2021 nutrition/Yusufov: Concurrent with procedures surgery  Sara 3, 2021 psychology/Leobardo: Concurrent with proceed with surgery  February 17, 2021 EKG: Normal sinus rhythm with rate of 80, nonspecific ST abnormalities      Impression:    Tee Craig is a 41 y.o. black female with a body mass index of 51 with obesity related comorbidities consisting of stress urinary incontinence, obstructive sleep apnea and weight related arthropathy who would benefit from bariatric surgery. We've discussed the restrictive and malabsorptive nature of the gastric bypass and compared and contrasted with the sleeve gastrectomy. The patient understands the likelihood of losing approximately 80% of their excess weight in 12 to 18 months. She also understands the risks including but not limited to bleeding, infection, need for reoperation, anastomotic ulcers, leaks and strictures, bowel obstruction secondary to adhesions and internal hernias, DVT, PE, heart attack, stroke, and death. Patient also understands risks of inadequate weight loss, excess weight loss, vitamin insufficiency, protein malnutrition, excess skin, and loss of hair. We have reviewed the components of a successful postoperative course including requirement for a high protein, low carbohydrate diet, 60 oz a day of zero calorie liquids, daily vitamin supplementation, daily exercise, regular follow-up, and participation in support groups.   We have reviewed the preoperative liver shrinking clear liquid diet, as well as reviewed any medication changes required while on the clear liquid diet. In addition, the patient understands that all medications to be taken during the first 8 weeks postoperatively can be taken whole as long as the medication is approximately the size of a Wilian 325 mg aspirin tablet in size. The patient further understands that it is his/her responsibility to review these and verify with their primary care doctor and pharmacist that all medications are of the appropriate size.   We will schedule the patient for laparoscopic gastric bypass gastric bypass in the near future after receiving recommendations from her primary care physician in regard to her elevated TSH

## 2021-07-15 NOTE — PROGRESS NOTES
Preop History and Physical Exam:    Bindu Hernadez is a 41 y.o. black female initially evaluated in this office on February 4, 2021 for discussion of the surgical options available for definitive management of her super obesity. Patient at that time weighed 325 pounds with a body mass index of 52 with obesity related comorbidities of stress urinary incontinence, obstructive sleep apnea and weight related arthropathy. After discussing surgical options the patient elected pursue laparoscopic potentially open Sathya-en-Y gastric bypass to achieve definitive durable weight loss on a personal level expected resolution of obesity related comorbidities the patient returns today after completing the bariatric surgical multidisciplinary programmatic requirements necessary prior to the procedure of surgery for discussion of the diagnostic evaluation and surgical scheduling with the only new medical or surgical history noted being a laparoscopic hysterectomy in March 2021 for fibroids. The patient currently weighs 317 pounds and a 5 feet 6 inches frame with a body mass index of 51 with obesity related comorbidities of stress urinary incontinence, obstructive sleep apnea and weight related arthropathy. Ideal body weight 134 pounds, excess body weight 183 pounds, estimated postsurgical weight loss 146 pounds, postsurgical goal weight 171 pounds    Past Medical History:   Diagnosis Date    Depression     Fibroids     IBS (irritable bowel syndrome)     Sleep apnea        No past surgical history on file. Current Outpatient Medications   Medication Sig Dispense Refill    venlafaxine-SR (EFFEXOR-XR) 150 mg capsule TAKE 2 CAPSULES BY MOUTH EVERY DAY IN THE MORNING      ARIPiprazole (ABILIFY) 15 mg tablet       Linzess 145 mcg cap capsule TAKE 1 CAPSULE BY MOUTH ONCE A DAY BEFORE MEALS      traZODone (DESYREL) 100 mg tablet TAKE 2 TABLET BY MOUTH EVERY NIGHT AS NEEDED FOR SLEEP.          Not on File    Social History Tobacco Use    Smoking status: Never Smoker    Smokeless tobacco: Never Used   Substance Use Topics    Alcohol use: Yes     Comment: socially    Drug use: Never       Family History   Problem Relation Age of Onset    Diabetes Mother     Hypertension Mother     Hypertension Father        Review of Systems:  Positive in BOLD    CONST: Fever, weight loss, fatigue or chills  GI: Nausea, vomiting, abdominal pain, change in bowel habits, hematochezia, melena, and GERD   INTEG: Dermatitis, abnormal moles  HEENT: Recent changes in vision, vertigo, epistaxis, dysphagia and hoarseness  CV: Chest pain, palpitations, HTN, edema and varicosities  RESP: Cough, shortness of breath, wheezing, hemoptysis, snoring and reactive airway disease  : Hematuria, dysuria, frequency, urgency, nocturia and stress urinary incontinence   MS: Weakness, joint pain and arthritis  ENDO: Diabetes, thyroid disease, polyuria, polydipsia, polyphagia, poor wound healing, heat intolerance, cold intolerance  LYMPH/HEME: Anemia, bruising and history of blood transfusions  NEURO: Dizziness, headache, fainting, seizures and stroke  PSYCH: Anxiety and depression    Physical Exam    Visit Vitals  Temp 97.7 °F (36.5 °C) (Oral)   Resp 20   Ht 5' 6\" (1.676 m)   Wt 143.8 kg (317 lb)   BMI 51.17 kg/m²         General: Well developed, well nourished 41 y.o.) female in no acute distress  Head: Normocephalic, atraumatic  Resp: Clear to auscultation bilaterally, now wheeze, rhonchi, or rales, excursions normal and symmetrical  Cardio: Regular rate and rhythm, no murmurs, clicks, gallops, or rubs. No edema or varicosities. Abdomen: Obese, soft, nontender, nondistended, normoactive bowel sounds, no hernias, no hepatosplenomegaly  Psych: Alert and oriented to person, place, and time.     March 25, 2021 CBC, CMP, cholesterol panel, TSH, urinalysis, B1, B12, folate, iron, vitamin D, H. pylori: Albumin 3.5, hematocrit 31.6, hemoglobin 9.1, vitamin D 23.4, TSH 5.51, iron 16 with remainder laboratory profile being within normal limits patient was begun on supplemental vitamin D, iron/vitamin C and protein. The labs were submitted to her primary care physician for evaluation of the elevated TSH  Pap smear March 2021: No evidence of malignancy status post hysterectomy  February 2021: Chest x-ray-no active cardiopulmonary disease  February 10, 2021 bilateral mammography: BI-RADS 2 with no evidence of malignancy  July 1, 2021 nutrition/Yusufov: Concurrent with procedures surgery  Sara 3, 2021 psychology/Booth: Concurrent with proceed with surgery  February 17, 2021 EKG: Normal sinus rhythm with rate of 80, nonspecific ST abnormalities      Impression:    Dali Tolbetr is a 41 y.o. black female with a body mass index of 51 with obesity related comorbidities consisting of stress urinary incontinence, obstructive sleep apnea and weight related arthropathy who would benefit from bariatric surgery. We've discussed the restrictive and malabsorptive nature of the gastric bypass and compared and contrasted with the sleeve gastrectomy. The patient understands the likelihood of losing approximately 80% of their excess weight in 12 to 18 months. She also understands the risks including but not limited to bleeding, infection, need for reoperation, anastomotic ulcers, leaks and strictures, bowel obstruction secondary to adhesions and internal hernias, DVT, PE, heart attack, stroke, and death. Patient also understands risks of inadequate weight loss, excess weight loss, vitamin insufficiency, protein malnutrition, excess skin, and loss of hair. We have reviewed the components of a successful postoperative course including requirement for a high protein, low carbohydrate diet, 60 oz a day of zero calorie liquids, daily vitamin supplementation, daily exercise, regular follow-up, and participation in support groups.   We have reviewed the preoperative liver shrinking clear liquid diet, as well as reviewed any medication changes required while on the clear liquid diet. In addition, the patient understands that all medications to be taken during the first 8 weeks postoperatively can be taken whole as long as the medication is approximately the size of a Wilian 325 mg aspirin tablet in size. The patient further understands that it is his/her responsibility to review these and verify with their primary care doctor and pharmacist that all medications are of the appropriate size.   We will schedule the patient for laparoscopic gastric bypass gastric bypass in the near future after receiving recommendations from her primary care physician in regard to her elevated TSH

## 2021-07-19 ENCOUNTER — DOCUMENTATION ONLY (OUTPATIENT)
Dept: BARIATRICS/WEIGHT MGMT | Age: 41
End: 2021-07-19

## 2021-07-20 ENCOUNTER — HOSPITAL ENCOUNTER (OUTPATIENT)
Dept: BARIATRICS/WEIGHT MGMT | Age: 41
Discharge: HOME | End: 2021-07-20

## 2021-07-20 ENCOUNTER — TELEPHONE (OUTPATIENT)
Dept: MEDSURG UNIT | Age: 41
End: 2021-07-20

## 2021-07-20 RX ORDER — ENOXAPARIN SODIUM 100 MG/ML
40 INJECTION SUBCUTANEOUS DAILY
Qty: 7 SYRINGE | Refills: 0 | Status: SHIPPED | OUTPATIENT
Start: 2021-08-04 | End: 2021-11-04

## 2021-07-20 NOTE — PROGRESS NOTES
CLINICAL NUTRITION PRE-OPERATIVE EDUCATION    Patient's Name: Myles Blount   Age: 41 y.o. YOB: 1980   Sex: female    Education & Materials Provided:   - Soft Protein Diet Shopping List  -  Supplemental Resource Guide: MVI, B12, Calcium Citrate, Vitamin D, Vitamin B1,   and iron recommendations  - Protein Supplement Information  - Fluid Requirements/ No Straws  - No Caffeine or Carbonation   - No alcohol              - No Snacks or No Concentrated Sweets     - Exercising   - [facility]. Support System after surgery includes: x     - Key Diet Principles            - Addressed Current Habits/Changes to Make   - Patient has been educated on the liquid diet to begin 1 week prior to surgery. Patient understands the transition of the diet. Attendance of support group: Yes    Summary:  Patient has completed the required amount of visits with the Registered Dietitian. During these nutrition visits, we focused on dietary changes, behavior changes, and the importance of establishing an exercise routine. The diet protocol that patient was prescribed emphasized on low carbohydrates (less than 100 grams per day prior to surgery and 60-80 grams of protein per day). At today's session, patient was educated on the post-op diet protocol. Patient understands the importance of keeping total fat and sugar less than 3 grams per serving. Patient is aware of the transition of the diet stages and is aware that they will be on clear liquids for 7days, followed by soft protein for 5 weeks. Patient understands the body needs ~ 60-70 grams of protein per day. During the liquid phase, patient will need 60 grams of protein from shakes. Once eating soft protein, patient will only need 1 shake per day. Patient is aware of the importance of the vitamins and protein drinks. We spent a lot of time talking about the vitamins.   Patient understands the importance of being compliant with the diet protocol and the complications and risks that can occur if they are non-compliant with the nutritional protocol and non-compliant with the vitamins. Patient has also been educated on the pre-op liquid diet for 1 week. Patient understands that failure to comply in pre-op liquid diet could result in surgery being canceled. Patient's 6 week post-op nutrition appointment has been scheduled. At this 6 week visit, RD will assess how patient is tolerating soft protein and advance to vegetables, if tolerating soft protein without difficulty. Patient will also see RD again at 9 months post-op. This visit will assess patient's compliance with current protocol, including diet, vitamins, protein shakes, and exercise. Post-op diet guidelines will be reinforced. RD is available for questions and to meet with patient outside of the 6 week and 9 month post-op visit. Ok to proceed.      Candidate for surgery: Yes  Re-evaluation Date:     Troy Monge Aakash [address] RD  7/19/2021

## 2021-07-20 NOTE — TELEPHONE ENCOUNTER
Gastric Bypass Instruct patient to read and understand how their surgery works. The laparoscopic Sathya-en-Y Gastric Bypass -- often called gastric  bypass -- is considered the gold standard of weight loss surgery. The procedure: The gastric bypass is one of the most frequently performed weight  loss procedures in the [place]. In this procedure, stapling  creates a small (15 to 20 milliliters) stomach pouch. The remainder  of the stomach is not removed but is completely stapled shut and divided from the stomach  pouch. The outlet from this newly formed pouch empties directly into the lower portion of the  small intestine, thus bypassing calorie absorption. This is done by dividing the small intestine just  beyond the duodenum for the purpose of bringing it up and constructing a connection with the  newly formed stomach pouch. The other end is connected into the side of the Sathya limb of the  intestine creating the Y shape that gives the technique its name. The length of either segment of  the intestine can be increased to produce lower or higher levels of malabsorption. Most importantly, the rerouting of the food stream produces changes in gut hormones that  promote feeling of fullness, suppress hunger, and reverse one of the primary mechanisms by which  obesity induces Type 2 diabetes. Advantages:   The average excess weight loss after the gastric bypass procedure is generally higher in a  compliant patient than with purely restrictive procedures.  One year after surgery, weight loss can average 60 to 80 percent of excess body weight.  Studies show that after 10 to 14 years, 50 to 60 percent of excess body weight loss has been  maintained by some patients.  A 2000 study of 500 patients showed that 96 percent of associated health conditions (back  pain, sleep apnea, high blood pressure, diabetes and depression) were improved or resolved.   Disadvantages:   Because the duodenum is bypassed, poor absorption of iron and calcium can result in the  lowering of total body iron and a predisposition to iron deficiency anemia.  A chronic anemia caused by vitamin B-12 deficiency may occur. This problem usually can be  prevented with vitamin B-12 pills under the tongue or injections.  In some cases, the effectiveness of the procedure may be reduced if the stomach pouch is  stretched and/or if it is initially left larger than 15 to 30 cc.  The bypassed portion of the stomach, duodenum and segments of the small intestine cannot  be easily visualized using X-ray or endoscopy if there are any problems, such as ulcers,  bleeding or malignancy. Sleeve Gastrectomy  The laparoscopic sleeve gastrectomy -- often called the  sleeve -- is performed by removing approximately 80 percent  of the stomach. The remaining stomach is a tubular pouch that  resembles a banana. The procedure:  During the sleeve gastrectomy procedure, a thin, vertical sleeve  of stomach is created by using a stapling device. The sleeve is about the size of a banana. The rest  of the stomach is removed. By creating a smaller stomach pouch, a sleeve gastrectomy limits the  amount of food that can be eaten at one time so you feel full sooner and stay full longer. As you  eat less food, your body will stop storing excess calories and start using its fat supply for energy. The greater impact, however, seems to be the effect the surgery has on gut hormones that impact  a number of factors including hunger, feeling of fullness, and blood sugar control. Advantages:   Eliminates the portion of the stomach that produces the hormones that stimulate hunger.  Minimizes the chance of an ulcer occurring.  Is an appealing option for people who are concerned about the complications of intestinal  bypass procedures or who have anemia, Crohns disease or various other conditions that make  intestinal bypass procedures too risky.   -- 13 --  PATIENT GUIDE TO Sánchez Robb  Disadvantages:   Potential for inadequate weight loss or weight regain. While this is true for all procedures, it is  more possible with procedures that do not have an intestinal bypass.  Higher BMI patients may need to have a second-stage procedure later to help lose additional  weight. Remember, two stages may ultimately be safer and more effective than one operation  for higher BMI patients.  This procedure does involve stomach stapling and therefore, leaks and other complications  related to stapling may occur.  This procedure is not reversible. Pre op Appoitments  PE LOCATION PROVIDER  2 Weeks  6-Week Nutrition  3 Months*  6 Months*  1 Year*  Patient Acknowledgement of Risks, Benefits,  and Alternatives to Bariatric Surgical Procedures  Patient Name:_________________________________________________________________  :_ _______________________________________________________________________  I am requesting bariatric surgery be performed on me. I believe I may benefit from bariatric  surgery. This form is designed to ensure that I understand the risks, benefits, and alternatives to  having bariatric surgery. Bariatric surgery, or surgery for morbid obesity, is major surgery. The  options available include restrictive procedures, or those that limit digestive capacity. Examples  include vertical sleeve gastrectomy, or the adjustable gastric band (Lap-Band¢ç/Realize). Malabsorptive procedures, such as distal gastric bypass produce weight loss by decreasing nutrient  absorption. Biliopancreatic diversion with duodenal switch (BPD/DS) and Sathya-en-Y gastric  bypass combine these two processes to varying degrees. While most procedures can be performed  laparoscopically (through multiple small incisions), there is a possibility that an open technique  may be required (through a large incision).  There are alternatives to surgery including medications,  diet and exercise, and behavior modification. I understand that obesity is a chronic disease with no  known cure. I am choosing bariatric surgery as treatment for this disease. Patient initials: ______________  I am informed of the potential benefits from bariatric surgery which may include improvements  in associated comorbidities (ie; diabetes, obstructive sleep apnea, GERD, high blood pressure),  potential weight loss of 50-80 percent excess weight, and general improvement in quality of life. The benefits are not guaranteed, and are dependent upon me making the necessary lifestyle  changes for success. I am aware that some patients may not lose as much weight, or still may  require treatment for medical problems after bariatric surgery. Some patients may gain back some  or all of the weight lost after bariatric surgery. Bariatric surgery is a treatment tool, not a cure for  obesity. Compliance with the dietary and lifestyle recommendations is necessary for maintenance  of lost weight in the long term. For example, I have been taught that it is recommended that  all patients maintain a healthy diet consisting of low-carbohydrate, low-sugar foods rich in lean  protein, and non-starchy vegetables. Regular exercise including aerobic activity and weight  training is encouraged, as well as regular attendance at support group meetings. Patient initials: ______________  -- 23 --  PATIENT GUIDE TO Sánchez Robb  Eliminating habits that could be detrimental to my health such as drinking alcohol or smoking  is required for all patients. I am aware that the risks of smoking and alcohol use after bariatric  surgery include anesthesia complications, stomach ulcers, liver diseases and malnutrition.   In addition, research has shown an increase in sensitivity to alcohol particularly after gastric bypass  procedures resulting in rapid increases in blood alcohol levels and possible addiction. Patient initials: ______________  Because bariatric surgery is considered major surgery, there are many potential complications that  could arise. Some of the problems are related to the bariatric procedure itself, while others are  related to anesthesia and operating on the abdomen. I understand the serious potential complications include: deep venous thrombosis/pulmonary  embolism (blood clots in the legs and or lungs); gastrointestinal leak (leakage of digestive contents  into the abdomen); sepsis (serious infection); injury to adjacent organs such as esophagus, spleen,  pancreas, liver, diaphragm (requiring intervention or surgical removal); excessive bleeding; bowel  obstruction (blockage of the intestines); or organ failure. I am informed that these complications  can be life threatening. The overall mortality rate (risk of death) from bariatric surgery is close to  0.1 percent (1/1,000), but can be as high as 0.5 percent (1/200) for some patients. Patient initials: ______________  I understand that complications requiring re-operation may occur, either immediately after initial  surgery, or later in the recovery process. Patient initials: ______________  Other potential complications which I may have include: wound infections or seromas  (fluid collection under skin); hernias (breakdown of tissue holding in abdominal contents);  gastritis or stomach ulcer (inflammation of the stomach); formation of gallstones; formation  of kidney stones or urinary tract infection; or pneumonia. Device related complications such as  foreign body reaction, band slippage, or erosion may occur with the adjustable gastric band  (Lap-Band¢ç/Realize). Patient initials: ______________  -- 21 --  PATIENT GUIDE TO Sánchez Robb  I may struggle with food intolerances after bariatric surgery. These may include sugars, fats, and  lactose (milk sugar).  My taste for certain foods may change as well. Dumping Syndrome (reaction  caused by sugar rapidly entering the intestine -- symptoms include: nausea, sweating, weakness,  dizziness, flushing, possible vomiting and/or diarrhea) occurs often after bariatric surgery. Vomiting and changes in bowel habits may occur, and may be the result of eating too fast, taking  too large a bite, inappropriate food choice or not chewing properly. Chronic vomiting may be a  result of stenosis (tightening) in the stomach pouch and intestine, and may require that I have  treatment with endoscopy or surgery. Malabsorption may lead to diarrhea, foul smelling gas and  protein malnutrition. Though rarely, I may require feedings through tubes into the digestive tract  or veins for nourishment. I am aware that in some patients hair loss or thinning may occur in the  rapid weight loss phase. This is usually temporary, but can be permanent in some cases. I have  been taught about the importance of proper hydration after bariatric surgery, and understand that  dehydration is the most common reason for re-admission to the hospital after surgery. Patient initials: ______________  I understand that over time, and especially with forced overeating, the stomach pouch may stretch  (dilate) or the staple lines may break. This can result in weight regain, ulcer formation or both. Patient initials: ______________  As a female undergoing bariatric surgery, I acknowledge that I must prevent pregnancies for at  least 12 to 18 months following surgery. Pregnancy during the rapid weight loss phase can be  dangerous and harmful to both the mother and fetus. Patient initials: ______________  Vitamin and mineral deficiencies can occur after bariatric surgery. I am instructed to take vitamin  and mineral supplements daily for life (including multivitamin, iron, B vitamins, calcium and vitamin  D).  Failure to comply with these recommendations could result in my experiencing weakness,  nerve or brain damage, confusion, fatigue, rashes, anemia, hair loss, bone loss, and mood changes. I agree to have lab work at regular intervals to assess for vitamin deficiencies. I understand that it  is imperative that I receive continued follow up care after my bariatric surgery by my surgeon, my  program, or other clinician experienced in bariatric care. Patient initials: ______________  -- 21 --  PATIENT GUIDE TO Sánchez Robb  If I have an adjustable gastric band (Lap-Band¢ç/Realize), needle adjustments (addition/removal  of saline solution) are required for weight loss and to maintain weight loss. Patient initials: ______________  After I lose weight, the skin of my arms, legs, abdomen, neck, and face may become wrinkled,  droop or sag. Rashes and infections may occur in between my skin folds. Cosmetic surgery may  be indicated in some cases. This is not considered medically necessary in most cases and is rarely  covered by insurance. Patient initials: ______________  I understand that psychological changes may occur with weight loss as a result of bariatric surgery,  which can affect relationships with my loved ones. I agree to re-engage with a mental health  provider as needed. Patient initials: ______________  Some patients elect to have revisional bariatric surgery (correcting anatomic defects from a  previous bariatric operation). I am aware that in these cases, all of the previously addressed risks  apply, however they are three to five times more common. Patient initials: ______________  Follow-up appointments are vital. I agree to the following schedule of appointments after surgery:  two to three weeks, three months, six months, 12 months, and then annually for life. Additional  appointments may be necessary. Gastric Band patient follow-up is determined by my need for  adjustments but is at least once per year after the first year.   Patient initials: ______________  -- 25 --  PATIENT GUIDE  Patient Acknowledgement of Risks, Benefits,  and Alternatives to Bariatric Surgical Procedures  Patient Name:_________________________________________________________________  :_ _______________________________________________________________________  I am requesting bariatric surgery be performed on me. I believe I may benefit from bariatric  surgery. This form is designed to ensure that I understand the risks, benefits, and alternatives to  having bariatric surgery. Bariatric surgery, or surgery for morbid obesity, is major surgery. The  options available include restrictive procedures, or those that limit digestive capacity. Examples  include vertical sleeve gastrectomy, or the adjustable gastric band (Lap-Band¢ç/Realize). Malabsorptive procedures, such as distal gastric bypass produce weight loss by decreasing nutrient  absorption. Biliopancreatic diversion with duodenal switch (BPD/DS) and Sathya-en-Y gastric  bypass combine these two processes to varying degrees. While most procedures can be performed  laparoscopically (through multiple small incisions), there is a possibility that an open technique  may be required (through a large incision). There are alternatives to surgery including medications,  diet and exercise, and behavior modification. I understand that obesity is a chronic disease with no  known cure. I am choosing bariatric surgery as treatment for this disease. Patient initials: ______________  I am informed of the potential benefits from bariatric surgery which may include improvements  in associated comorbidities (ie; diabetes, obstructive sleep apnea, GERD, high blood pressure),  potential weight loss of 50-80 percent excess weight, and general improvement in quality of life. The benefits are not guaranteed, and are dependent upon me making the necessary lifestyle  changes for success.  I am aware that some patients may not lose as much weight, or still may  require treatment for medical problems after bariatric surgery. Some patients may gain back some  or all of the weight lost after bariatric surgery. Bariatric surgery is a treatment tool, not a cure for  obesity. Compliance with the dietary and lifestyle recommendations is necessary for maintenance  of lost weight in the long term. For example, I have been taught that it is recommended that  all patients maintain a healthy diet consisting of low-carbohydrate, low-sugar foods rich in lean  protein, and non-starchy vegetables. Regular exercise including aerobic activity and weight  training is encouraged, as well as regular attendance at support group meetings. Patient initials: ______________  -- 23 --  PATIENT GUIDE TO Sánchez Robb  Eliminating habits that could be detrimental to my health such as drinking alcohol or smoking  is required for all patients. I am aware that the risks of smoking and alcohol use after bariatric  surgery include anesthesia complications, stomach ulcers, liver diseases and malnutrition. In addition, research has shown an increase in sensitivity to alcohol particularly after gastric bypass  procedures resulting in rapid increases in blood alcohol levels and possible addiction. Patient initials: ______________  Because bariatric surgery is considered major surgery, there are many potential complications that  could arise. Some of the problems are related to the bariatric procedure itself, while others are  related to anesthesia and operating on the abdomen.   I understand the serious potential complications include: deep venous thrombosis/pulmonary  embolism (blood clots in the legs and or lungs); gastrointestinal leak (leakage of digestive contents  into the abdomen); sepsis (serious infection); injury to adjacent organs such as esophagus, spleen,  pancreas, liver, diaphragm (requiring intervention or surgical removal); excessive bleeding; bowel  obstruction (blockage of the intestines); or organ failure. I am informed that these complications  can be life threatening. The overall mortality rate (risk of death) from bariatric surgery is close to  0.1 percent (1/1,000), but can be as high as 0.5 percent (1/200) for some patients. Patient initials: ______________  I understand that complications requiring re-operation may occur, either immediately after initial  surgery, or later in the recovery process. Patient initials: ______________  Other potential complications which I may have include: wound infections or seromas  (fluid collection under skin); hernias (breakdown of tissue holding in abdominal contents);  gastritis or stomach ulcer (inflammation of the stomach); formation of gallstones; formation  of kidney stones or urinary tract infection; or pneumonia. Device related complications such as  foreign body reaction, band slippage, or erosion may occur with the adjustable gastric band  (Lap-Band¢ç/Realize). Patient initials: ______________  -- 21 --  PATIENT GUIDE TO Sánchez Robb  I may struggle with food intolerances after bariatric surgery. These may include sugars, fats, and  lactose (milk sugar). My taste for certain foods may change as well. Dumping Syndrome (reaction  caused by sugar rapidly entering the intestine -- symptoms include: nausea, sweating, weakness,  dizziness, flushing, possible vomiting and/or diarrhea) occurs often after bariatric surgery. Vomiting and changes in bowel habits may occur, and may be the result of eating too fast, taking  too large a bite, inappropriate food choice or not chewing properly. Chronic vomiting may be a  result of stenosis (tightening) in the stomach pouch and intestine, and may require that I have  treatment with endoscopy or surgery. Malabsorption may lead to diarrhea, foul smelling gas and  protein malnutrition.  Though rarely, I may require feedings through tubes into the digestive tract  or veins for nourishment. I am aware that in some patients hair loss or thinning may occur in the  rapid weight loss phase. This is usually temporary, but can be permanent in some cases. I have  been taught about the importance of proper hydration after bariatric surgery, and understand that  dehydration is the most common reason for re-admission to the hospital after surgery. Patient initials: ______________  I understand that over time, and especially with forced overeating, the stomach pouch may stretch  (dilate) or the staple lines may break. This can result in weight regain, ulcer formation or both. Patient initials: ______________  As a female undergoing bariatric surgery, I acknowledge that I must prevent pregnancies for at  least 12 to 18 months following surgery. Pregnancy during the rapid weight loss phase can be  dangerous and harmful to both the mother and fetus. Patient initials: ______________  Vitamin and mineral deficiencies can occur after bariatric surgery. I am instructed to take vitamin  and mineral supplements daily for life (including multivitamin, iron, B vitamins, calcium and vitamin  D). Failure to comply with these recommendations could result in my experiencing weakness,  nerve or brain damage, confusion, fatigue, rashes, anemia, hair loss, bone loss, and mood changes. I agree to have lab work at regular intervals to assess for vitamin deficiencies. I understand that it  is imperative that I receive continued follow up care after my bariatric surgery by my surgeon, my  program, or other clinician experienced in bariatric care. If I have an adjustable gastric band (Lap-Band¢ç/Realize), needle adjustments (addition/removal  of saline solution) are required for weight loss and to maintain weight loss.   Patient initials: ______________  After I lose weight, the skin of my arms, legs, abdomen, neck, and face may become wrinkled,  droop or sag. Rashes and infections may occur in between my skin folds. Cosmetic surgery may  be indicated in some cases. This is not considered medically necessary in most cases and is rarely  covered by insurance. Patient initials: ______________  I understand that psychological changes may occur with weight loss as a result of bariatric surgery,  which can affect relationships with my loved ones. I agree to re-engage with a mental health  provider as needed. Patient initials: ______________  Some patients elect to have revisional bariatric surgery (correcting anatomic defects from a  previous bariatric operation). I am aware that in these cases, all of the previously addressed risks  apply, however they are three to five times more common. Patient initials: ______________  Follow-up appointments are vital. I agree to the following schedule of appointments after surgery:  two to three weeks, three months, six months, 12 months, and then annually for life. Additional  appointments may be necessary. Gastric Band patient follow-up is determined by my need for  adjustments but is at least once per year after the first year  Diet Quick Review  7-Day Preoperative Liquid Diet  Benefits:   Reducing intake before surgery will shrink  your liver by depleting glycogen (a form of  stored energy).  Reduced liver size gives better access to  stomach during surgery, which translates  to a safer surgery.  Prevents the last supper syndrome.  Experiencing weight loss before the  procedure encourages postoperative  compliance and jump-starts weight loss. Specifics:   Start seven days before surgery:  ____________________.  NO SOLID FOODS!!   Your surgery will be CANCELED if this  diet is not followed!!!   Minimum of 64 ounces of fluid daily,  including protein drinks.  No added sugar or carbonated beverages.    Continue to take all your prescribed  medication and your vitamin supplements  during this preoperative diet phase. Clear liquids:   Water.  Sugar free, non-carbonated beverages  (crystal light, propel).  Sugar free popsicles.  Sugar free Jell-O.   Fat free, reduced sodium broth .  Decaffeinated coffee or decaffeinated tea  with artificial sweeteners. Protein:   60 grams of protein daily  (in liquid supplements).  Pre-made protein drinks.  Protein powder added to water.  3 gram rule -- limit sugar and fat to less  than 3 grams per 8 ounces.  4 to 6 ounces of low fat/low sugar yogurt  OR cottage cheese three to four times  during the week. [facility] Gastric Bypass and Sleeve Dietary Progression Quick Review     Date of Surgery: _      __________Clear liquid diet.  Begin bariatric clear liquid diet on: ______________________________________________   64 ounces of fluid per day.  Low calorie, low sugar, non-carbonated beverages:  -- Water, Crystal Light, Propel Water, Sugar Free Jell-O, Sugar Free Popsicles, bouillon. -- Start protein supplement during this stage (60 to 70 grams per day). -- Start all vitamin supplements during this stage (see pages 57-58). -- Getting your fluid in and staying hydrated is your number one priority!  The clear liquid diet will last for seven days. ____________________________________________________     t.  Begin bariatric soft and moist diet on: _____________________________________________  [place] Saliva This stage of the diet will last for five weeks, unless otherwise instructed by your surgeon.  Begin -- one week after surgery.  End -- six weeks after surgery (or when you follow up with the registered dietitian).  Soft, moist, high protein foods -- three meals per day plus protein supplements. -- Portions should emphasize on soft protein. -- Portions will be a MAXIMUM of 1 ounce of solid food and 2 to 3 ounces of cottage cheese and yogurt.   -- Protein supplements should be between meals and provide 30 to 40 grams per day during  soft protein diet. -- Continue to get 64 ounces of fluid in per day.  Protein foods that are OK (SLOW TRANSITION) on the soft and moist diet:  -- First week on soft protein should focus on yogurt, cottage cheese, eggs, VEGETARIAN refried  beans, black beans, kidney beans and white beans. (NO BAKED BEANS.)  -- Second through fourth weeks should focus on yogurt, cottage cheese, eggs, canned tuna,  canned chicken, tilapia and fish (needs to be soft enough to be cut up with a fork). -- Fifth week on soft protein diet should focus on yogurt, cottage cheese, eggs, canned tuna,  canned chicken, tilapia, fish, salmon, chicken breast or turkey. Remember to continue to get 64 ounces of fluid daily on ALL stages. To be advanced to bariatric maintenance stage of the bariatric diet, follow up with the dietitian  six weeks after surgery, around:   ___________________________________________________  After having a sleeve gastrectomy I will not be able to take NSAIDs  (non-steroidal anti-inflammatory drugs) for _________ weeks. 15. After having a gastric bypass I will not be able to take NSAIDs  (non-steroidal anti-inflammatory drugs) for _____________ weeks     Please discuss all of your current medications with your surgeon at your preoperative appointment. Your surgeon will inform you regarding which medications to stop before surgery and which  medications you are to take the morning of surgery. Medications to Stop  To minimize the risk of blood loss during surgery,  you must avoid or stop taking medicines that  contain anti-inflammatories, blood thinners, arthritis  medications, and herbal supplements seven to 14 days  before your surgery. A nurse from pre-anesthesia  testing will review your list of medication. Your current  medications will be reviewed at your preoperative  appointment with your surgeon. Note: You may take prescribed narcotics, such as  Vicodin, Ultram and Neurontin.   Diabetic Medications  Adjustments in your diabetic medications will be discussed with your surgeon at your  preoperative appointment. Birth Control  In order to decrease your chance of getting a postoperative blood clot you will be required to stop  any oral contraceptive two weeks before your surgery date. During this time it is your responsibility  to use an effective form of birth control. You will be required to take a pregnancy test the morning  of surgery at the hospital and surgery will be canceled if you are pregnant. We strongly encourage  that you resume your birth control two weeks after surgery or after your first menstrual cycle  following surgery. Do NOT start any new medications within a month of surgery without  discussing it with your surgeon and/or bariatric team first.  Non-Steroidal Anti-Inflammatory Drugs (NSAIDs)  Bypass:  One class of medications to avoid after Sathya-en-Y gastric  bypass is NSAIDs. These can cause ulcers or stomach irritation  and are linked to a kind of ulcer called a marginal ulcer after  gastric bypass. Marginal ulcers can bleed or perforate. Usually  they are not fatal, but they can cause months or years of pain,  and are a common cause of re-operation and reversal of gastric  bypass. You will NEVER be able to take NSAIDs again. Your only choice for over the counter pain medication will be  Tylenol (acetaminophen). Steroid use can also be harmful to your stomach but may be necessary in some situations. Please consult with your bariatric surgeon and prescribing physician for approval.  Sleeve gastrectomy:  Following a sleeve gastrectomy you will not be allowed to take NSAIDs unless it has been  discussed and approved by your surgeon. Most commonly taken NSAIDs to be AVOIDED!! 1. Ibuprofen  2. Advil  3. Motrin  4. Excedrin  5. Aspirin  6. Celebrex  7. Naproxen  8. Aleve  9. Voltaren  10. Mobic     ___Pregnancy  It is in your best interest to avoid pregnancy for  12 to 18 months after surgery.  Pregnancy during  the rapid weight loss phase can be dangerous  and harmful to both mother and fetus. Do you have an effective method of birth  control? Please consult with your OB/GYN or  PCP for consultation. Alcohol  Alcohol is not recommended after bariatric  surgery. Alcohol contains calories but minimal  nutrition and will work against your weight  loss goal. For example, wine contains twice  the calories per ounce that regular soda does. The absorption of alcohol changes with gastric  bypass and gastric sleeve because an enzyme  in the stomach which usually begins to digest  alcohol is absent or greatly reduced making  alcohol more potent. Alcohol may also be absorbed more quickly  into the body after gastric bypass or gastric  sleeve. The absorbed alcohol will be more  potent, and studies have demonstrated  that obesity surgery patients reach a higher  alcohol level and maintain the higher levels for  a longer period than others. In some patients  alcohol use can increase and lead to alcohol  dependence or addiction. For all of these  reasons, it is recommended to avoid alcohol  after bariatric surgery. ___________________________________________  Smoking  It is required that ALL patients stop smoking  (including e-cigarettes and marijuana) and  chewing tobacco three months before  surgery. Prior to surgery your surgeon will  order a test to verify that you have quit. Your  surgery will be canceled if you are smoking. Smoking or chewing tobacco leads to  decreased blood supply to your bodys tissues  and delays healing. Smoking harms every  organ in the body and has been linked to:   Blood clots (the leading cause of death after  bariatric surgery).  Marginal ulcers after gastric bypass.  Heart disease.  Stroke.  Chronic obstructive pulmonary  (lung) disease.  Increased risk for hip fracture.  Cataracts.    Cancer of the mouth, throat, esophagus,  larynx (voice box), stomach, pancreas,  bladder, cervix, and kidney. Packing For the [address] your suitcase for the hospital a day or two before your surgery. Anti-skid socks will be provided. Items to Include in Your Bag   Clothing such as short gowns, short pajamas,  shorts, tops, loose-fitting shorts, bathrobe,  capris, etc.   Tennis shoes or flat runner sole shoes that tie.  Toiletries.  Waterless hand .  Eyeglasses, contact lenses and denture cases.  A list of medications you are currently taking,  including frequency and dosages.  Magazines, books, needle work, crossword  puzzles, etc.   A method of payment to pay for prescriptions. What Not to Bring to the [address] [place] [address] wallet or purse.  Jewelry or other valuables.  Open-toe slippers or shoes without backs. Countdown to Surgery  14 to 30 Days   Attend a preoperative class with the  dietitian and bariatric coordinator.  Pre-admission testing will contact you.  Schedule your preoperative appointment  with your surgeon. 10 to 14 Days   Stop taking medications as instructed by  your physician. Seven Days   Start liquid diet.  Stop all NSAIDS/aspirin. Three Days Before Surgery   Begin CHG skin prep. Day Before Surgery   Pack for the hospital.   Surgical time will be provided via phone call.  YOU MAY NOT HAVE ANYTHING TO EAT  OR DRINK AFTER MIDNIGHT ON THE DAY  BEFORE YOUR SURGERY. This includes gum,  mints, water, etc. You may brush your teeth  the morning of surgery but do not swallow  the water. Simply rinse your mouth out. Day of Surgery   Take medications and brush your teeth  with a sip (1 teaspoon) of water (only the  medications you have been instructed to  take by your surgeon).  Remember to report two hours before your  surgery time.  This will give the nursing staff  sufficient time to start IVs, prep you for  surgery and answer questions  If instructed by your surgeon to use sliding scale insulin   o Use regular insulin (Novolog Pen) according to the following insulin sliding scale:  BLOOD SUGAR: AMOUNT OF INSULIN:  Under 150 no insulin  150-200 2 units  201-250 4 units  251-300 6 units  301-350 8 units  351-400 10 units  400 or greater 12 units and call physician     Things to do following the preoperative class:  ? Thoroughly read this binder before surgery. Things to do before surgery:  ? Start the preoperative liquid diet on: _____________________________________________  ? Stop all NSAIDS (see page 30) and aspirin seven days before my surgery: _________________ .  Anatoliy Lopez my doctor(PCP or surgeon) regarding stopping Coumadin, Plavix or  other blood thinners. ? Purchase bariatric clear liquids (Crystal Lite, sugar free Jell-O, broth, sugar free popsicles,  protein supplement) and bariatric soft and moist foods (low fat yogurt, cottage cheese, eggs,  tuna, fish, chicken) so that Im prepared when I get home from the hospital.  ? Purchase all vitamins that will be required following surgery. o Chewable multivitamin -- two per day (ex: Flintstones Complete). o Calcium Citrate -- 1,500 milligrams (500 milligrams, three times a day). o Vitamin B-12 -- 1,000 micrograms daily. o Vitamin D3 -- 5,000 IU daily. ? Create a system to keep track of how many ounces of fluid I am drinking daily  o Postoperative GOAL = minimum of 64 ounces per day. ? Purchase a protein supplement that I like.  o Brand: _ _________________________________________________________________ .  o Ounces: _________________________________________________________________ .  Tono Shallow of protein per serving: _________________________________________________ . -- 28 --  PATIENT GUIDE TO [address]  6. YOUR SURGERY  Day of Surgery  Before [place] your teeth -- upon awakening, you may brush your teeth and rinse with water, but do not  swallow the water.    Take medication -- take only the medications as instructed by your provider with a small sip of  water as soon as you get up.  Wear proper clothing that is loose-fitting and easily removed.  Avoid back zippers and pantyhose.  Please remove ALL jewelry (leave ALL jewelry and valuables at home).  Avoid using perfumes, deodorant, shaving creams or any scented lotions.  Bring a case with your name on it to hold your eyeglasses, contact lenses, hearing aids  and dentures. Reporting to the [address] [place] [address] will be asked to arrive approximately two  hours before your scheduled surgery. It is important  that you arrive on time to the hospital to avoid any  problems with starting your surgery on time. In some  cases lateness could result in moving your surgery to  a much later time. Your physicians office will provide  your time of arrival to the hospital.  Where Do You Report the Day of Surgery? Kathi: [address] , [place], [address]. Enter through the main entrance. Turn left just past the information desk into the  Registration Office. You will check in for surgery there. You may designate one person to be contacted when your surgery has been completed. -- 36 --  [facility]  Before Surgery       Preoperative Preparation Area  Once you arrive at the hospital you will be escorted to the preoperative preparation area. During the preoperative phase, a nurse will review your medical records and conduct a brief  physical examination to include vital signs (i.e., blood pressure, pulse, temperature, respirations or  breathing). An intravenous tube will be inserted with IV fluids. Your skin will be cleansed with CHG  wipes. If you wear dentures, eyeglasses or contact lenses you will need to remove them at this time.   You will see your surgeon, your anesthesiologist and meet the members of your surgical team.  Medications, such as antibiotics, may be given by anesthetists to decrease your infection risk. Other medications may be given to allay any anxiety you may have. You are allowed to have two family members or friends in the preoperative area before surgery. Going into Surgery  The operating room team will escort you into the operating room where your abdomen will be  prepared for surgery. There will be a time out -- a verification of the correct operative site for  patient safety purposes -- performed. Once in the operating room, monitoring devices, such  as a blood pressure cuff, heart monitor and oxygen monitor, will be attached. You will be given  supplemental oxygen as you are readied for anesthesia.  The average length of time for a laparoscopic sleeve gastrectomy is 60 to 90 minutes.  The average length of time for a Sathya-en-Y gastric bypass is two to two and a half hours. In the Recovery Area  After your surgery is completed, you will be wheeled into the recovery room. In the recovery room:   Nurses will frequently check your vital signs (i.e., blood pressure, pulse, breathing).  Nurses will medicate you for your pain as needed through the IV line.  Nurses will encourage you to take deep breaths and to move your ankles and feet. Please inform your family the length of time in the recoveryYou will move to your hospital room from the recovery area when you are ready. Your post-surgical  recovery begins here. room will ashley  What to Expect During [address]  From the recovery room, you will be transferred to your hospital room on the bariatric unit  known as 2 Surgical. The private rooms are spacious with large windows and beautiful views. The staff is specially trained in caring for bariatric patients and are extremely friendly and  knowledgeable. We look forward to caring for you during your hospital stay. IV -- IV fluids will be given to help nourish your body after surgery. You will also be given IV pain  medication.  IV fluids will continue until you are discharged from the hospital.  Heart rate monitor (telemetry) -- A heart rate monitor will be worn only in the recovery room  unless otherwise indicated. Sherman catheter -- A Sherman catheter will be placed in your bladder while you are in the OR and  removed when you arrive to the recovery room. Nasal cannula -- Oxygen will be worn in the nose the night of surgery. Anticoagulation -- A blood thinner may be administered to you to prevent blood clots. Lovenox,  an injectable medication will be used. Drainage tube -- A drainage tube may be inserted into your abdomen during surgery. This tube  collects bloody drainage after surgery. This may be removed prior to discharge from the hospital or  you may be discharged with the drain and it will be removed by your surgeon at your postoperative  visit. If you are discharged with a drain you will be taught how to empty it and care for it. After Surgery   If you do not see your providers clean their hands, please ask them to do so.  Family and friends who visit you should not touch the surgical wound or dressings.  Family and friends should clean their hands with soap and water or an alcohol-based hand  rub before and after visiting you. If you do not see them clean their hands, ask them to clean  their hands.  Make sure you understand how to care for your incision before you leave the hospital.   Always clean your hands before and after caring for your incision.  Make sure you know who to contact if you have questions or problems after you get home.  If you have any symptoms of an infection, such as redness and pain at the surgery site, drainage,  or fever, call your doctor immediately.  Ask your nursing staff to help you out of bed to walk within five hours of arriving at your  hospital room. Getting out of bed to walk helps to decrease complications, such as blood clots  and pneumonia.    of Stay  Yl be required to stay in the hospital for at least ONE night, possibly TWO. Lngth of Stay  Oly Godoy be required to stay in the hospital for at least ONE night, possibly TWO.  edications and Pain Control Options  There are many types of pain control methods that are available to control discomfort. Effective  pain control will allow you to be up and walking shortly after surgery. Your doctor will choose  the method that is right for you. Regardless of the method of pain medication being used, it is  important for you to communicate with your nurse if the pain medication is not enough. Call your  nurse for pain medication when the pain is moderate instead of waiting for when pain is severe. What type of pain should I expect?  Abdominal pain   Rib pain   Shoulder pain   Brick feeling in the center of your chest  Nausea:  Nausea following bariatric surgery is very  common. Causes include:   Anesthesia   Drinking too fast   Pain medication   Surgery itself           Length of Stay  You willExpectations on your Day of Surgery   You will be allowed 1 to 2 ounces of ice chips per hour when you are admitted to the floor. They are solely for your comfort. They are not required.  You will be expected to walk the night of your surgery. Please ask your nurse or nursing assistant  for help the first time you walk. Please do not walk if you still feel groggy or unsteady on your feet.  Your pain will be controlled with oral and IV pain medication on the day of surgery.  In order to prevent pneumonia after surgery you please use your incentive spirometer (ICS)  at least 10 times per hour (see below). be [email or web address]  Bariatric Clear Liquid Diet  You will be started on the bariatric clear liquid diet the morning after your surgery. Your GOAL  will be to drink 4 ounces per hour (SLOW and STEADY) of the following liquids: water, crystal lite,  Jell-O, broth and Unjury (protein supplement).  Feel free to bring your favorite protein supplement  from home.   Two important requirements when drinking is you must slowly SIP the fluid and you must be  SITTING up. Walking  while in the hospital you are expected to walk EVERY hour in the hallway. During your hospital  stay you will also be expected to sit in the recliner throughout the day rather than lie in bed. Pain Medication  The morning after surgery you will continue with oral pain medication ONLY for your pain control. Incentive Spirometer  Continue using your incentive spirometer(ICS) 10 times per hour.

## 2021-07-27 RX ORDER — BISMUTH SUBSALICYLATE 262 MG
1 TABLET,CHEWABLE ORAL DAILY
COMMUNITY

## 2021-07-27 RX ORDER — FERROUS SULFATE 324(65)MG
65 TABLET, DELAYED RELEASE (ENTERIC COATED) ORAL
COMMUNITY
End: 2021-08-05

## 2021-07-27 NOTE — PERIOP NOTES
PRE-SURGICAL INSTRUCTIONS        Patient's Name:  Shaun Meyers      Today's Date:  7/27/2021              Surgery Date:  8/4/2021                1. Do NOT eat or drink anything, including candy, gum, or ice chips after midnight on 08/03/2021, unless you have specific instructions from your surgeon or anesthesia provider to do so.  2. You may brush your teeth before coming to the hospital.  3. No smoking 24 hours prior to the day of surgery. 4. No alcohol 24 hours prior to the day of surgery. 5. No recreational drugs for one week prior to the day of surgery. 6. Leave all valuables, including money/purse, at home. 7. Remove all jewelry, nail polish, acrylic nails, and makeup (including mascara); no lotions powders, deodorant, or perfume/cologne/after shave on the skin. 8. Glasses/contact lenses and dentures may be worn to the hospital.  They will be removed prior to surgery. 9. Call your doctor if symptoms of a cold or illness develop within 24-48 hours prior to your surgery. 10.  AN ADULT MUST DRIVE YOU HOME AFTER OUTPATIENT SURGERY. 11.  If you are having an outpatient procedure, please make arrangements for a responsible adult to be with you for 24 hours after your surgery. Special Instructions:      Bring list of CURRENT medications. Bring CPAP machine. Bring any pertinent legal medical records. Follow physician instructions about stopping anticoagulants. On the day of surgery, come in the main [facility]. Let the  at the desk know you are there for surgery. A staff member will come escort you to the surgical area on the second floor. If you have any questions or concerns, please do not hesitate to call:     (Prior to the day of surgery) PAT department:  [phone]   (Day of surgery) Pre-Op department:  [phone]    These surgical instructions were reviewed with patient during the PAT phone call.

## 2021-08-03 ENCOUNTER — ANESTHESIA EVENT (OUTPATIENT)
Dept: SURGERY | Age: 41
DRG: 621 | End: 2021-08-03
Payer: COMMERCIAL

## 2021-08-04 ENCOUNTER — ANESTHESIA (OUTPATIENT)
Dept: SURGERY | Age: 41
DRG: 621 | End: 2021-08-04
Payer: COMMERCIAL

## 2021-08-04 ENCOUNTER — HOSPITAL ENCOUNTER (INPATIENT)
Age: 41
LOS: 1 days | Discharge: HOME | DRG: 621 | End: 2021-08-05
Attending: SURGERY | Admitting: SURGERY
Payer: COMMERCIAL

## 2021-08-04 DIAGNOSIS — E66.01 MORBID OBESITY WITH BMI OF 45.0-49.9, ADULT (HCC): ICD-10-CM

## 2021-08-04 DIAGNOSIS — G89.18 POST-OP PAIN: Primary | ICD-10-CM

## 2021-08-04 LAB
ABO + RH BLD: NORMAL
BLOOD GROUP ANTIBODIES SERPL: NORMAL
HCG UR QL: NEGATIVE
SPECIMEN EXP DATE BLD: NORMAL

## 2021-08-04 PROCEDURE — 77030031139 HC SUT VCRL2 J&J -A: Performed by: SURGERY

## 2021-08-04 PROCEDURE — 0D164ZA BYPASS STOMACH TO JEJUNUM, PERCUTANEOUS ENDOSCOPIC APPROACH: ICD-10-PCS | Performed by: SURGERY

## 2021-08-04 PROCEDURE — 77030011808 HC STPLR ENDOSCOPIC J&J -D: Performed by: SURGERY

## 2021-08-04 PROCEDURE — 00797 ANES IPER UPR ABD GSTR PX MO: CPT | Performed by: NURSE ANESTHETIST, CERTIFIED REGISTERED

## 2021-08-04 PROCEDURE — 74011250636 HC RX REV CODE- 250/636: Performed by: NURSE ANESTHETIST, CERTIFIED REGISTERED

## 2021-08-04 PROCEDURE — 76010000131 HC OR TIME 2 TO 2.5 HR: Performed by: SURGERY

## 2021-08-04 PROCEDURE — 77030008603 HC TRCR ENDOSC EPATH J&J -C: Performed by: SURGERY

## 2021-08-04 PROCEDURE — 77030002933 HC SUT MCRYL J&J -A: Performed by: SURGERY

## 2021-08-04 PROCEDURE — 47379 UNLISTED LAPS PX LIVER: CPT | Performed by: SURGERY

## 2021-08-04 PROCEDURE — 77030009968 HC RELD STPLR ENDOSC J&J -D: Performed by: SURGERY

## 2021-08-04 PROCEDURE — 2709999900 HC NON-CHARGEABLE SUPPLY

## 2021-08-04 PROCEDURE — 77030040361 HC SLV COMPR DVT MDII -B: Performed by: SURGERY

## 2021-08-04 PROCEDURE — 77030027876 HC STPLR ENDOSC FLX PWR J&J -G1: Performed by: SURGERY

## 2021-08-04 PROCEDURE — 77030002996 HC SUT SLK J&J -A: Performed by: SURGERY

## 2021-08-04 PROCEDURE — 88307 TISSUE EXAM BY PATHOLOGIST: CPT

## 2021-08-04 PROCEDURE — 77030010031 HC SCIS ENDOSC MPLR J&J -C: Performed by: SURGERY

## 2021-08-04 PROCEDURE — 65270000029 HC RM PRIVATE

## 2021-08-04 PROCEDURE — 77030040922 HC BLNKT HYPOTHRM STRY -A: Performed by: SURGERY

## 2021-08-04 PROCEDURE — 81025 URINE PREGNANCY TEST: CPT

## 2021-08-04 PROCEDURE — 0FB24ZX EXCISION OF LEFT LOBE LIVER, PERCUTANEOUS ENDOSCOPIC APPROACH, DIAGNOSTIC: ICD-10-PCS | Performed by: SURGERY

## 2021-08-04 PROCEDURE — C9290 INJ, BUPIVACAINE LIPOSOME: HCPCS | Performed by: SURGERY

## 2021-08-04 PROCEDURE — 86901 BLOOD TYPING SEROLOGIC RH(D): CPT

## 2021-08-04 PROCEDURE — 74011250636 HC RX REV CODE- 250/636: Performed by: SURGERY

## 2021-08-04 PROCEDURE — 74011250637 HC RX REV CODE- 250/637: Performed by: SURGERY

## 2021-08-04 PROCEDURE — 43644 LAP GASTRIC BYPASS/ROUX-EN-Y: CPT | Performed by: SURGERY

## 2021-08-04 PROCEDURE — 99024 POSTOP FOLLOW-UP VISIT: CPT | Performed by: NURSE PRACTITIONER

## 2021-08-04 PROCEDURE — 74011000250 HC RX REV CODE- 250: Performed by: ANESTHESIOLOGY

## 2021-08-04 PROCEDURE — 74011000272 HC RX REV CODE- 272: Performed by: SURGERY

## 2021-08-04 PROCEDURE — 74011000250 HC RX REV CODE- 250: Performed by: NURSE ANESTHETIST, CERTIFIED REGISTERED

## 2021-08-04 PROCEDURE — 74011000250 HC RX REV CODE- 250: Performed by: SURGERY

## 2021-08-04 PROCEDURE — 76210000006 HC OR PH I REC 0.5 TO 1 HR: Performed by: SURGERY

## 2021-08-04 PROCEDURE — 77030008437 HC REINF STRP REINF SEMGD WLGO -C: Performed by: SURGERY

## 2021-08-04 PROCEDURE — 88313 SPECIAL STAINS GROUP 2: CPT

## 2021-08-04 PROCEDURE — 2709999900 HC NON-CHARGEABLE SUPPLY: Performed by: SURGERY

## 2021-08-04 PROCEDURE — 77030008756 HC TU IRR SUC STRY -B: Performed by: SURGERY

## 2021-08-04 PROCEDURE — 74011250636 HC RX REV CODE- 250/636: Performed by: ANESTHESIOLOGY

## 2021-08-04 PROCEDURE — 76060000035 HC ANESTHESIA 2 TO 2.5 HR: Performed by: SURGERY

## 2021-08-04 PROCEDURE — 00797 ANES IPER UPR ABD GSTR PX MO: CPT | Performed by: ANESTHESIOLOGY

## 2021-08-04 PROCEDURE — 77030033639 HC SHR ENDO COAG HARM 36 J&J -E: Performed by: SURGERY

## 2021-08-04 PROCEDURE — 77030009932 HC PRB FT CTRL J&J -B: Performed by: SURGERY

## 2021-08-04 PROCEDURE — 77030027138 HC INCENT SPIROMETER -A: Performed by: SURGERY

## 2021-08-04 PROCEDURE — 77030036732 HC RELD STPLR VASC J&J -F: Performed by: SURGERY

## 2021-08-04 PROCEDURE — 77030008598 HC TRCR ENDOSC BLDLS J&J -B: Performed by: SURGERY

## 2021-08-04 PROCEDURE — 77030019605: Performed by: SURGERY

## 2021-08-04 RX ORDER — DIPHENHYDRAMINE HYDROCHLORIDE 50 MG/ML
12.5 INJECTION, SOLUTION INTRAMUSCULAR; INTRAVENOUS
Status: DISCONTINUED | OUTPATIENT
Start: 2021-08-04 | End: 2021-08-04 | Stop reason: HOSPADM

## 2021-08-04 RX ORDER — KETOROLAC TROMETHAMINE 15 MG/ML
15 INJECTION, SOLUTION INTRAMUSCULAR; INTRAVENOUS
Status: DISCONTINUED | OUTPATIENT
Start: 2021-08-04 | End: 2021-08-05 | Stop reason: HOSPADM

## 2021-08-04 RX ORDER — SODIUM CHLORIDE, SODIUM LACTATE, POTASSIUM CHLORIDE, CALCIUM CHLORIDE 600; 310; 30; 20 MG/100ML; MG/100ML; MG/100ML; MG/100ML
25 INJECTION, SOLUTION INTRAVENOUS CONTINUOUS
Status: DISCONTINUED | OUTPATIENT
Start: 2021-08-04 | End: 2021-08-04 | Stop reason: HOSPADM

## 2021-08-04 RX ORDER — HYDROMORPHONE HYDROCHLORIDE 1 MG/ML
1 INJECTION, SOLUTION INTRAMUSCULAR; INTRAVENOUS; SUBCUTANEOUS
Status: DISCONTINUED | OUTPATIENT
Start: 2021-08-04 | End: 2021-08-05 | Stop reason: HOSPADM

## 2021-08-04 RX ORDER — FENTANYL CITRATE 50 UG/ML
50 INJECTION, SOLUTION INTRAMUSCULAR; INTRAVENOUS
Status: COMPLETED | OUTPATIENT
Start: 2021-08-04 | End: 2021-08-04

## 2021-08-04 RX ORDER — ACETAMINOPHEN 325 MG/1
650 TABLET ORAL EVERY 6 HOURS
Status: DISCONTINUED | OUTPATIENT
Start: 2021-08-04 | End: 2021-08-05 | Stop reason: HOSPADM

## 2021-08-04 RX ORDER — ONDANSETRON 2 MG/ML
4 INJECTION INTRAMUSCULAR; INTRAVENOUS
Status: DISCONTINUED | OUTPATIENT
Start: 2021-08-04 | End: 2021-08-05 | Stop reason: HOSPADM

## 2021-08-04 RX ORDER — SODIUM CHLORIDE 0.9 % (FLUSH) 0.9 %
5-40 SYRINGE (ML) INJECTION AS NEEDED
Status: DISCONTINUED | OUTPATIENT
Start: 2021-08-04 | End: 2021-08-04 | Stop reason: HOSPADM

## 2021-08-04 RX ORDER — HYDROMORPHONE HYDROCHLORIDE 1 MG/ML
0.5 INJECTION, SOLUTION INTRAMUSCULAR; INTRAVENOUS; SUBCUTANEOUS AS NEEDED
Status: DISCONTINUED | OUTPATIENT
Start: 2021-08-04 | End: 2021-08-04 | Stop reason: HOSPADM

## 2021-08-04 RX ORDER — ROCURONIUM BROMIDE 10 MG/ML
INJECTION, SOLUTION INTRAVENOUS AS NEEDED
Status: DISCONTINUED | OUTPATIENT
Start: 2021-08-04 | End: 2021-08-04 | Stop reason: HOSPADM

## 2021-08-04 RX ORDER — SODIUM CHLORIDE, SODIUM LACTATE, POTASSIUM CHLORIDE, CALCIUM CHLORIDE 600; 310; 30; 20 MG/100ML; MG/100ML; MG/100ML; MG/100ML
150 INJECTION, SOLUTION INTRAVENOUS CONTINUOUS
Status: DISCONTINUED | OUTPATIENT
Start: 2021-08-04 | End: 2021-08-04 | Stop reason: HOSPADM

## 2021-08-04 RX ORDER — ENOXAPARIN SODIUM 100 MG/ML
40 INJECTION SUBCUTANEOUS EVERY 24 HOURS
Status: DISCONTINUED | OUTPATIENT
Start: 2021-08-05 | End: 2021-08-05

## 2021-08-04 RX ORDER — ENOXAPARIN SODIUM 100 MG/ML
40 INJECTION SUBCUTANEOUS ONCE
Status: COMPLETED | OUTPATIENT
Start: 2021-08-04 | End: 2021-08-04

## 2021-08-04 RX ORDER — NEOSTIGMINE METHYLSULFATE 1 MG/ML
INJECTION, SOLUTION INTRAVENOUS AS NEEDED
Status: DISCONTINUED | OUTPATIENT
Start: 2021-08-04 | End: 2021-08-04 | Stop reason: HOSPADM

## 2021-08-04 RX ORDER — DIPHENHYDRAMINE HYDROCHLORIDE 50 MG/ML
25 INJECTION, SOLUTION INTRAMUSCULAR; INTRAVENOUS
Status: DISCONTINUED | OUTPATIENT
Start: 2021-08-04 | End: 2021-08-05 | Stop reason: HOSPADM

## 2021-08-04 RX ORDER — PROPOFOL 10 MG/ML
INJECTION, EMULSION INTRAVENOUS AS NEEDED
Status: DISCONTINUED | OUTPATIENT
Start: 2021-08-04 | End: 2021-08-04 | Stop reason: HOSPADM

## 2021-08-04 RX ORDER — LIDOCAINE HYDROCHLORIDE 20 MG/ML
INJECTION, SOLUTION EPIDURAL; INFILTRATION; INTRACAUDAL; PERINEURAL AS NEEDED
Status: DISCONTINUED | OUTPATIENT
Start: 2021-08-04 | End: 2021-08-04 | Stop reason: HOSPADM

## 2021-08-04 RX ORDER — ACETAMINOPHEN 650 MG/1
SUPPOSITORY RECTAL AS NEEDED
Status: DISCONTINUED | OUTPATIENT
Start: 2021-08-04 | End: 2021-08-04 | Stop reason: HOSPADM

## 2021-08-04 RX ORDER — MIDAZOLAM HYDROCHLORIDE 1 MG/ML
INJECTION, SOLUTION INTRAMUSCULAR; INTRAVENOUS AS NEEDED
Status: DISCONTINUED | OUTPATIENT
Start: 2021-08-04 | End: 2021-08-04 | Stop reason: HOSPADM

## 2021-08-04 RX ORDER — FAMOTIDINE 20 MG/1
20 TABLET, FILM COATED ORAL ONCE
Status: DISCONTINUED | OUTPATIENT
Start: 2021-08-04 | End: 2021-08-04

## 2021-08-04 RX ORDER — CEFAZOLIN SODIUM 1 G/3ML
INJECTION, POWDER, FOR SOLUTION INTRAMUSCULAR; INTRAVENOUS AS NEEDED
Status: DISCONTINUED | OUTPATIENT
Start: 2021-08-04 | End: 2021-08-04 | Stop reason: HOSPADM

## 2021-08-04 RX ORDER — LIDOCAINE HYDROCHLORIDE 10 MG/ML
0.1 INJECTION, SOLUTION EPIDURAL; INFILTRATION; INTRACAUDAL; PERINEURAL AS NEEDED
Status: DISCONTINUED | OUTPATIENT
Start: 2021-08-04 | End: 2021-08-04 | Stop reason: HOSPADM

## 2021-08-04 RX ORDER — ONDANSETRON 2 MG/ML
4 INJECTION INTRAMUSCULAR; INTRAVENOUS ONCE
Status: COMPLETED | OUTPATIENT
Start: 2021-08-04 | End: 2021-08-04

## 2021-08-04 RX ORDER — SODIUM CHLORIDE 0.9 % (FLUSH) 0.9 %
5-40 SYRINGE (ML) INJECTION EVERY 8 HOURS
Status: DISCONTINUED | OUTPATIENT
Start: 2021-08-04 | End: 2021-08-04 | Stop reason: HOSPADM

## 2021-08-04 RX ORDER — FENTANYL CITRATE 50 UG/ML
INJECTION, SOLUTION INTRAMUSCULAR; INTRAVENOUS AS NEEDED
Status: DISCONTINUED | OUTPATIENT
Start: 2021-08-04 | End: 2021-08-04 | Stop reason: HOSPADM

## 2021-08-04 RX ORDER — OXYCODONE HYDROCHLORIDE 5 MG/1
5 TABLET ORAL
Status: DISCONTINUED | OUTPATIENT
Start: 2021-08-04 | End: 2021-08-05 | Stop reason: HOSPADM

## 2021-08-04 RX ORDER — FAMOTIDINE 20 MG/1
20 TABLET, FILM COATED ORAL ONCE
Status: DISCONTINUED | OUTPATIENT
Start: 2021-08-04 | End: 2021-08-04 | Stop reason: SDUPTHER

## 2021-08-04 RX ORDER — ONDANSETRON 2 MG/ML
INJECTION INTRAMUSCULAR; INTRAVENOUS AS NEEDED
Status: DISCONTINUED | OUTPATIENT
Start: 2021-08-04 | End: 2021-08-04 | Stop reason: HOSPADM

## 2021-08-04 RX ORDER — GLYCOPYRROLATE 0.2 MG/ML
INJECTION INTRAMUSCULAR; INTRAVENOUS AS NEEDED
Status: DISCONTINUED | OUTPATIENT
Start: 2021-08-04 | End: 2021-08-04 | Stop reason: HOSPADM

## 2021-08-04 RX ORDER — CHLORHEXIDINE GLUCONATE 4 G/100ML
SOLUTION TOPICAL AS NEEDED
Status: DISCONTINUED | OUTPATIENT
Start: 2021-08-04 | End: 2021-08-04 | Stop reason: HOSPADM

## 2021-08-04 RX ORDER — HYDROMORPHONE HYDROCHLORIDE 2 MG/ML
INJECTION, SOLUTION INTRAMUSCULAR; INTRAVENOUS; SUBCUTANEOUS AS NEEDED
Status: DISCONTINUED | OUTPATIENT
Start: 2021-08-04 | End: 2021-08-04 | Stop reason: HOSPADM

## 2021-08-04 RX ORDER — ACETAMINOPHEN 650 MG/1
650 SUPPOSITORY RECTAL ONCE
Status: DISCONTINUED | OUTPATIENT
Start: 2021-08-04 | End: 2021-08-04 | Stop reason: HOSPADM

## 2021-08-04 RX ORDER — HYOSCYAMINE SULFATE 0.12 MG/1
0.12 TABLET SUBLINGUAL
Status: DISCONTINUED | OUTPATIENT
Start: 2021-08-04 | End: 2021-08-05 | Stop reason: HOSPADM

## 2021-08-04 RX ORDER — NALOXONE HYDROCHLORIDE 0.4 MG/ML
0.4 INJECTION, SOLUTION INTRAMUSCULAR; INTRAVENOUS; SUBCUTANEOUS AS NEEDED
Status: DISCONTINUED | OUTPATIENT
Start: 2021-08-04 | End: 2021-08-05 | Stop reason: HOSPADM

## 2021-08-04 RX ORDER — SODIUM CHLORIDE, SODIUM LACTATE, POTASSIUM CHLORIDE, CALCIUM CHLORIDE 600; 310; 30; 20 MG/100ML; MG/100ML; MG/100ML; MG/100ML
150 INJECTION, SOLUTION INTRAVENOUS CONTINUOUS
Status: DISCONTINUED | OUTPATIENT
Start: 2021-08-04 | End: 2021-08-05 | Stop reason: HOSPADM

## 2021-08-04 RX ORDER — NALOXONE HYDROCHLORIDE 0.4 MG/ML
0.1 INJECTION, SOLUTION INTRAMUSCULAR; INTRAVENOUS; SUBCUTANEOUS ONCE
Status: DISCONTINUED | OUTPATIENT
Start: 2021-08-04 | End: 2021-08-04 | Stop reason: HOSPADM

## 2021-08-04 RX ADMIN — OXYCODONE HYDROCHLORIDE 5 MG: 5 TABLET ORAL at 20:26

## 2021-08-04 RX ADMIN — ACETAMINOPHEN 650 MG: 325 TABLET ORAL at 17:24

## 2021-08-04 RX ADMIN — HYDROMORPHONE HYDROCHLORIDE 0.4 MG: 2 INJECTION, SOLUTION INTRAMUSCULAR; INTRAVENOUS; SUBCUTANEOUS at 08:31

## 2021-08-04 RX ADMIN — SODIUM CHLORIDE, SODIUM LACTATE, POTASSIUM CHLORIDE, AND CALCIUM CHLORIDE 150 ML/HR: 600; 310; 30; 20 INJECTION, SOLUTION INTRAVENOUS at 17:24

## 2021-08-04 RX ADMIN — ONDANSETRON 4 MG: 2 INJECTION INTRAMUSCULAR; INTRAVENOUS at 07:58

## 2021-08-04 RX ADMIN — LIDOCAINE HYDROCHLORIDE 100 MG: 20 INJECTION, SOLUTION EPIDURAL; INFILTRATION; INTRACAUDAL; PERINEURAL at 07:56

## 2021-08-04 RX ADMIN — CEFAZOLIN 3 G: 330 INJECTION, POWDER, FOR SOLUTION INTRAMUSCULAR; INTRAVENOUS at 08:04

## 2021-08-04 RX ADMIN — GLYCOPYRROLATE 0.6 MG: 0.2 INJECTION INTRAMUSCULAR; INTRAVENOUS at 09:31

## 2021-08-04 RX ADMIN — HYDROMORPHONE HYDROCHLORIDE 0.4 MG: 2 INJECTION, SOLUTION INTRAMUSCULAR; INTRAVENOUS; SUBCUTANEOUS at 09:33

## 2021-08-04 RX ADMIN — PROPOFOL 200 MG: 10 INJECTION, EMULSION INTRAVENOUS at 07:56

## 2021-08-04 RX ADMIN — HYOSCYAMINE SULFATE 0.12 MG: 0.12 TABLET ORAL; SUBLINGUAL at 17:24

## 2021-08-04 RX ADMIN — FENTANYL CITRATE 50 MCG: 50 INJECTION, SOLUTION INTRAMUSCULAR; INTRAVENOUS at 08:07

## 2021-08-04 RX ADMIN — SUGAMMADEX 400 MG: 100 INJECTION, SOLUTION INTRAVENOUS at 09:39

## 2021-08-04 RX ADMIN — FENTANYL CITRATE 50 MCG: 50 INJECTION, SOLUTION INTRAMUSCULAR; INTRAVENOUS at 07:56

## 2021-08-04 RX ADMIN — SODIUM CHLORIDE, SODIUM LACTATE, POTASSIUM CHLORIDE, AND CALCIUM CHLORIDE 25 ML/HR: 600; 310; 30; 20 INJECTION, SOLUTION INTRAVENOUS at 06:55

## 2021-08-04 RX ADMIN — HYDROMORPHONE HYDROCHLORIDE 0.4 MG: 2 INJECTION, SOLUTION INTRAMUSCULAR; INTRAVENOUS; SUBCUTANEOUS at 07:43

## 2021-08-04 RX ADMIN — ROCURONIUM BROMIDE 10 MG: 50 INJECTION INTRAVENOUS at 09:06

## 2021-08-04 RX ADMIN — MIDAZOLAM HYDROCHLORIDE 2 MG: 2 INJECTION, SOLUTION INTRAMUSCULAR; INTRAVENOUS at 07:43

## 2021-08-04 RX ADMIN — ENOXAPARIN SODIUM 40 MG: 40 INJECTION SUBCUTANEOUS at 06:56

## 2021-08-04 RX ADMIN — FAMOTIDINE 20 MG: 10 INJECTION INTRAVENOUS at 07:05

## 2021-08-04 RX ADMIN — ROCURONIUM BROMIDE 50 MG: 50 INJECTION INTRAVENOUS at 08:03

## 2021-08-04 RX ADMIN — Medication 5 MG: at 09:31

## 2021-08-04 RX ADMIN — KETOROLAC TROMETHAMINE 15 MG: 15 INJECTION, SOLUTION INTRAMUSCULAR; INTRAVENOUS at 17:24

## 2021-08-04 RX ADMIN — ONDANSETRON 4 MG: 2 INJECTION INTRAMUSCULAR; INTRAVENOUS at 10:04

## 2021-08-04 RX ADMIN — FENTANYL CITRATE 50 MCG: 50 INJECTION, SOLUTION INTRAMUSCULAR; INTRAVENOUS at 10:38

## 2021-08-04 RX ADMIN — ACETAMINOPHEN 650 MG: 325 TABLET ORAL at 23:00

## 2021-08-04 RX ADMIN — FENTANYL CITRATE 50 MCG: 50 INJECTION, SOLUTION INTRAMUSCULAR; INTRAVENOUS at 10:04

## 2021-08-04 NOTE — ANESTHESIA POSTPROCEDURE EVALUATION
Procedure(s):  LAPAROSCOPIC BROOKE-EN-Y GASTRIC BYPASS WITH LIVER WEDGE BIOPSY. general    Anesthesia Post Evaluation      Multimodal analgesia: multimodal analgesia used between 6 hours prior to anesthesia start to PACU discharge  Patient location during evaluation: bedside  Patient participation: complete - patient participated  Level of consciousness: awake  Pain management: adequate  Airway patency: patent  Anesthetic complications: no  Cardiovascular status: stable  Respiratory status: acceptable  Hydration status: acceptable  Post anesthesia nausea and vomiting:  controlled      INITIAL Post-op Vital signs:   Vitals Value Taken Time   /74 08/04/21 1127   Temp 36.4 °C (97.5 °F) 08/04/21 0953   Pulse 80 08/04/21 1137   Resp 16 08/04/21 1137   SpO2 100 % 08/04/21 1137   Vitals shown include unvalidated device data.

## 2021-08-04 NOTE — BRIEF OP NOTE
Brief Postoperative Note    Patient: Rebeca Matamoros  YOB: 1980  MRN: [id number]    Date of Procedure: 8/4/2021     Pre-Op Diagnosis: 1. Morbid obesity ([address] [E66.01]  Sleep apnea, unspecified type [G47.30]  2. Steatosis    Post-Op Diagnosis: Same as preoperative diagnosis. Procedure(s):  1. LAPAROSCOPIC BROOKE-EN-Y GASTRIC BYPASS 2.   Laparoscopic left pedicle wedge biopsy    Surgeon(s):  Jose Rodriguez DO    Surgical Assistant: Surg Asst-1: Lexie Peters    Anesthesia: General     Estimated Blood Loss (mL): Minimal    Complications: None    Specimens:   ID Type Source Tests Collected by Time Destination   1 : liver biopsy Preservative Liver  Jose Rodriguez DO 8/4/2021 0935 Pathology        Implants: * No implants in log *    Drains: * No LDAs found *    Findings: Hepatomegaly with morphologic appearance hepatic steatosis    Electronically Signed by Charisma Rivera DO on 8/4/2021 at 9:50 AM

## 2021-08-04 NOTE — PERIOP NOTES
TRANSFER - OUT REPORT:    [facility]bal [facility] [place]  being transferred to [facility](unit) for routine post - op       Report consisted of patients Situation, Background, Assessment and   Recommendations(SBAR). Information from the following report(s) SBAR, Intake/Output and MAR was reviewed with the receiving nurse. Lines:   Peripheral IV 08/04/21 Left Antecubital (Active)   Site Assessment Clean, dry, & intact 08/04/21 0954   Phlebitis Assessment 0 08/04/21 0954   Infiltration Assessment 0 08/04/21 0954   Dressing Status Clean, dry, & intact 08/04/21 0954   Dressing Type Transparent 08/04/21 0954   Hub Color/Line Status Pink; Infusing 08/04/21 0954        Opportunity for questions and clarification was provided.       Patient transported with:   [employer]

## 2021-08-04 NOTE — ROUTINE PROCESS
End of Shift Note     Bedside and verbal shift change report given to Francisco Foley RN (On coming nurse) by Kamla Villanueva RN (Off going nurse).   Report included the following information:      --Procedure Summary     --MAR,     --Recent Results     --Med Rec Status

## 2021-08-04 NOTE — PROGRESS NOTES
Problem: Falls - Risk of  Goal: *Absence of Falls  Description: Document Jodie Pal Fall Risk and appropriate interventions in the flowsheet.   Outcome: Progressing Towards Goal  Note: Fall Risk Interventions:                                Problem: Patient Education: Go to Patient Education Activity  Goal: Patient/Family Education  Outcome: Progressing Towards Goal     Problem: Pain  Goal: *Control of Pain  Outcome: Progressing Towards Goal     Problem: Patient Education: Go to Patient Education Activity  Goal: Patient/Family Education  Outcome: Progressing Towards Goal     Problem: Patient Education: Go to Patient Education Activity  Goal: Patient/Family Education  Outcome: Progressing Towards Goal     Problem: Laparoscopic Gastric Bypass:Day of Surgery  Goal: Off Pathway (Use only if patient is Off Pathway)  Outcome: Progressing Towards Goal  Goal: Activity/Safety  Outcome: Progressing Towards Goal  Goal: Consults, if ordered  Outcome: Progressing Towards Goal  Goal: Diagnostic Test/Procedures  Outcome: Progressing Towards Goal  Goal: Nutrition/Diet  Outcome: Progressing Towards Goal  Goal: Medications  Outcome: Progressing Towards Goal  Goal: Respiratory  Outcome: Progressing Towards Goal  Goal: Treatments/Interventions/Procedures  Outcome: Progressing Towards Goal  Goal: Psychosocial  Outcome: Progressing Towards Goal  Goal: *No signs and symptoms of infection or wound complications  Outcome: Progressing Towards Goal  Goal: *Optimal pain control at patient's stated goal  Outcome: Progressing Towards Goal  Goal: *Adequate urinary output (equal to or greater than 30 milliliters/hour)  Outcome: Progressing Towards Goal  Goal: *Hemodynamically stable  Outcome: Progressing Towards Goal  Goal: *Tolerating diet  Outcome: Progressing Towards Goal  Goal: *Demonstrates progressive activity  Outcome: Progressing Towards Goal  Goal: *Absence of signs and symptoms of DVT  Outcome: Progressing Towards Goal  Goal: *Labs within defined limits  Outcome: Progressing Towards Goal  Goal: *Oxygen saturation within defined limits  Outcome: Progressing Towards Goal     Problem: Laparoscopic Gastric Bypass:Discharge Outcomes  Goal: *Active bowel sounds  Outcome: Progressing Towards Goal  Goal: *Absence of signs and symptoms of DVT  Outcome: Progressing Towards Goal  Goal: *Hemodynamically stable  Outcome: Progressing Towards Goal  Goal: *Lungs clear or at baseline  Outcome: Progressing Towards Goal  Goal: *Demonstrates independent activity or return to baseline  Outcome: Progressing Towards Goal  Goal: *Optimal pain control at patient's stated goal  Outcome: Progressing Towards Goal  Goal: *Verbalizes understanding and describes prescribed diet  Outcome: Progressing Towards Goal  Goal: *Tolerating diet  Outcome: Progressing Towards Goal  Goal: *Verbalizes name, dosage, time, side effects, and number of days to continue medications  Outcome: Progressing Towards Goal  Goal: *No signs and symptoms of infection or wound complications  Outcome: Progressing Towards Goal  Goal: *Anxiety reduced or absent  Outcome: Progressing Towards Goal  Goal: *Understands and describes signs and symptoms to report to providers (eg: s/s of leak, DVT; inability to tolerate diet)  Outcome: Progressing Towards Goal  Goal: *Describes follow-up/return visits to physicians  Outcome: Progressing Towards Goal  Goal: *Describes available resources and support systems  Outcome: Progressing Towards Goal

## 2021-08-04 NOTE — ROUTINE PROCESS
Pt assisted to the chair with call bell within reach and scds on bilat. Pt tolerated the procedure well.

## 2021-08-04 NOTE — PROGRESS NOTES
conducted a pre-surgery visit with Shaylee Burrows, who is a 41 y.o.,female. The  provided the following Interventions:  Initiated a relationship of care and support with patient. Patient does not have an advance directive at this time. Offered prayer and assurance of continued prayers on patient's behalf. Plan:  Chaplains will continue to follow and will provide pastoral care on an as needed/requested basis.  recommends bedside caregivers page  on duty if patient shows signs of acute spiritual or emotional distress.     Reiseñor 3   Board Certified [address] [place] [address]   [phone]

## 2021-08-04 NOTE — ANESTHESIA PREPROCEDURE EVALUATION
Relevant Problems   No relevant active problems       Anesthetic History   No history of anesthetic complications            Review of Systems / Medical History  Patient summary reviewed and pertinent labs reviewed    Pulmonary        Sleep apnea: CPAP           Neuro/Psych         Psychiatric history (Depression)     Cardiovascular                  Exercise tolerance: >4 METS     GI/Hepatic/Renal  Within defined limits             Comments: H/o IBS Endo/Other        Morbid obesity     Other Findings              Physical Exam    Airway  Mallampati: III  TM Distance: 4 - 6 cm  Neck ROM: normal range of motion   Mouth opening: Normal     Cardiovascular  Regular rate and rhythm,  S1 and S2 normal,  no murmur, click, rub, or gallop             Dental  No notable dental hx       Pulmonary  Breath sounds clear to auscultation               Abdominal  GI exam deferred       Other Findings            Anesthetic Plan    ASA: 3  Anesthesia type: general          Induction: Intravenous  Anesthetic plan and risks discussed with: Patient

## 2021-08-04 NOTE — INTERVAL H&P NOTE
Update History & Physical    The Patient's History and Physical of dated July 15, 2021 was reviewed. There is been no interval medical or surgical history  The proposed laparoscopic potentially open Sathya-en-Y gastric bypass to achieve definitive durable weight loss on a personal level with expected resolution of obesity related comorbidities was reviewed with the patient and I examined the patient. There was no change in surgical plan. The surgical site was confirmed by the patient and me. Plan:  The risk, benefits, expected outcome, and alternative to the recommended procedure have been discussed with the patient. Patient understands and wants to proceed with the procedure.     Electronically signed by Kristine Lynch DO on 8/4/2021 at 7:27 AM

## 2021-08-05 VITALS
RESPIRATION RATE: 16 BRPM | HEART RATE: 71 BPM | DIASTOLIC BLOOD PRESSURE: 87 MMHG | TEMPERATURE: 97.7 F | OXYGEN SATURATION: 99 % | BODY MASS INDEX: 45.99 KG/M2 | HEIGHT: 67 IN | WEIGHT: 293 LBS | SYSTOLIC BLOOD PRESSURE: 128 MMHG

## 2021-08-05 LAB
ANION GAP SERPL CALC-SCNC: 4 MMOL/L (ref 3–18)
BUN SERPL-MCNC: 7 MG/DL (ref 7–18)
BUN/CREAT SERPL: 11 (ref 12–20)
CALCIUM SERPL-MCNC: 8.7 MG/DL (ref 8.5–10.1)
CHLORIDE SERPL-SCNC: 105 MMOL/L (ref 100–111)
CO2 SERPL-SCNC: 27 MMOL/L (ref 21–32)
CREAT SERPL-MCNC: 0.63 MG/DL (ref 0.6–1.3)
ERYTHROCYTE [DISTWIDTH] IN BLOOD BY AUTOMATED COUNT: 16.3 % (ref 11.6–14.5)
GLUCOSE SERPL-MCNC: 73 MG/DL (ref 74–99)
HCT VFR BLD AUTO: 35.2 % (ref 35–45)
HGB BLD-MCNC: 10.9 G/DL (ref 12–16)
MAGNESIUM SERPL-MCNC: 2 MG/DL (ref 1.6–2.6)
MCH RBC QN AUTO: 24.7 PG (ref 24–34)
MCHC RBC AUTO-ENTMCNC: 31 G/DL (ref 31–37)
MCV RBC AUTO: 79.6 FL (ref 74–97)
PLATELET # BLD AUTO: 230 K/UL (ref 135–420)
PMV BLD AUTO: 10.1 FL (ref 9.2–11.8)
POTASSIUM SERPL-SCNC: 3.5 MMOL/L (ref 3.5–5.5)
RBC # BLD AUTO: 4.42 M/UL (ref 4.2–5.3)
SODIUM SERPL-SCNC: 136 MMOL/L (ref 136–145)
WBC # BLD AUTO: 6.7 K/UL (ref 4.6–13.2)

## 2021-08-05 PROCEDURE — 2709999900 HC NON-CHARGEABLE SUPPLY

## 2021-08-05 PROCEDURE — 83735 ASSAY OF MAGNESIUM: CPT

## 2021-08-05 PROCEDURE — 74011000250 HC RX REV CODE- 250: Performed by: SURGERY

## 2021-08-05 PROCEDURE — 74011250637 HC RX REV CODE- 250/637: Performed by: SURGERY

## 2021-08-05 PROCEDURE — 85027 COMPLETE CBC AUTOMATED: CPT

## 2021-08-05 PROCEDURE — 36415 COLL VENOUS BLD VENIPUNCTURE: CPT

## 2021-08-05 PROCEDURE — 74011250636 HC RX REV CODE- 250/636: Performed by: SURGERY

## 2021-08-05 PROCEDURE — 80048 BASIC METABOLIC PNL TOTAL CA: CPT

## 2021-08-05 PROCEDURE — C9113 INJ PANTOPRAZOLE SODIUM, VIA: HCPCS | Performed by: SURGERY

## 2021-08-05 RX ORDER — OXYCODONE HYDROCHLORIDE 5 MG/1
5 TABLET ORAL
Qty: 12 TABLET | Refills: 0 | Status: SHIPPED | OUTPATIENT
Start: 2021-08-05 | End: 2021-08-08

## 2021-08-05 RX ORDER — ENOXAPARIN SODIUM 100 MG/ML
40 INJECTION SUBCUTANEOUS EVERY 24 HOURS
Status: DISCONTINUED | OUTPATIENT
Start: 2021-08-05 | End: 2021-08-05 | Stop reason: HOSPADM

## 2021-08-05 RX ORDER — ONDANSETRON 4 MG/1
4 TABLET, ORALLY DISINTEGRATING ORAL
Qty: 12 TABLET | Refills: 0 | Status: SHIPPED | OUTPATIENT
Start: 2021-08-05 | End: 2021-08-11

## 2021-08-05 RX ADMIN — OXYCODONE HYDROCHLORIDE 5 MG: 5 TABLET ORAL at 05:54

## 2021-08-05 RX ADMIN — HYOSCYAMINE SULFATE 0.12 MG: 0.12 TABLET ORAL; SUBLINGUAL at 04:51

## 2021-08-05 RX ADMIN — SODIUM CHLORIDE 40 MG: 9 INJECTION, SOLUTION INTRAMUSCULAR; INTRAVENOUS; SUBCUTANEOUS at 10:57

## 2021-08-05 RX ADMIN — ACETAMINOPHEN 650 MG: 325 TABLET ORAL at 04:26

## 2021-08-05 RX ADMIN — OXYCODONE HYDROCHLORIDE 5 MG: 5 TABLET ORAL at 10:23

## 2021-08-05 RX ADMIN — OXYCODONE HYDROCHLORIDE 5 MG: 5 TABLET ORAL at 02:09

## 2021-08-05 RX ADMIN — ACETAMINOPHEN 650 MG: 325 TABLET ORAL at 10:23

## 2021-08-05 RX ADMIN — ENOXAPARIN SODIUM 40 MG: 40 INJECTION SUBCUTANEOUS at 10:23

## 2021-08-05 RX ADMIN — KETOROLAC TROMETHAMINE 15 MG: 15 INJECTION, SOLUTION INTRAMUSCULAR; INTRAVENOUS at 04:51

## 2021-08-05 NOTE — ROUTINE PROCESS
8/5/2021  1420    I have reviewed discharge instructions with the patient. The patient verbalized understanding. Patient armband removed and shredded.

## 2021-08-05 NOTE — ROUTINE PROCESS
End of Shift Note     Bedside and verbal shift change report given to Todd Collado RN (On coming nurse) by Enmanuel Osuna RN (Off going nurse).   Report included the following information:      --Procedure Summary     --MAR,     --Recent Results     --Med Rec Status    SBAR Recommendations: D/C    Issues for Provider to address D/C          Activity This Shift     [] Bed Rest Order   [] Refused   [] Dangled    [] TDWB         Ambulating:     [] Bathroom     [] BSC     [x] Room/Hallway      Up in Chair for meals    []Yes [] No   Voiding       [x] Yes  [] No  Sherman          [] Yes  [] No  Incontinent [] Yes  [] No    DUE TO VOID POUR        [] Yes [] No  Purewick    [] Yes [] No  New Onset [] Yes [] No Straight Cath   []Yes  [] No  Condom Cath  [] Yes [] No  MD Called      [] Yes  [] No   Blood Sugars Managed []Yes [x] No    Bowels Moved [] Yes [x] No    Incontinent     [] Yes [] No Passed Gas []Yes [x] No    New Onset  []Yes [] No        MD Called []Yes  [] No     CHG Bath Done     Before Surgery     After Surgery      [] Yes  [x] No  [] Yes  [x] No       Drain Removed [] Yes  [] No [x] N/A    Dressing Changed [] Yes   [] No [x] N/A      Nausea/Vomiting [] Yes   [x] No     Ice Packs Changed [x] Yes   [] No  [] N/A    Incentive Spirometer  [x] Yes  [] No      SCD Pumps On     Ankle Pumping  [x] Yes   [] No      [] Yes   [x] No        Telemetry Monitoring [] Yes   [x] No   Rhythm

## 2021-08-05 NOTE — DISCHARGE INSTRUCTIONS
Follow Directions in you BLUE instruction book. Patient was educated on LOVENOX (Blood Thinner) injections. 1. Wash and dry hands  2. Sit or lie in comfortable position  3. Choose an area around love handles 3 inches away from [facility]  4. Clean site with alcohol and let dry  5. Remove needle cap pull straight out  6. With your other and pinch an inch of the cleanses area and insert full length of needle straight in 90 degree  7. Press the plunger with your thumb slowly until syringe is empty. Pull needle out and point away from you. Push down on plunger to activate the safety shield. Push hard  8. Place the syringe in a plastic bottle and dispose in trash. 9. Rotate sites  10. Do not remove air from syringe before injection  11.  Do not rub the area

## 2021-08-05 NOTE — ROUTINE PROCESS
Patient was educated on all discharge instructions below. He/she understood and was provided a copy. He/she knows who to call for any issues post discharge. Hydration  Hydration is your NUMBER ONE priority. Dehydration is the most common reason for readmission to the hospital. Dehydration occurs when  your body does not get enough fluid to keep it functioning at its best. Your body also requires fluid  to burn its stored fat calories for energy. Carry a bottle of water with you all day, even when you are away from home; remind yourself to  drink even if you dont feel thirsty. Drinking 64 ounces of fluid is your daily goal. You can tell if  youre getting enough fluid if youre making clear, light-colored urine five to 10 times per day. Signs of dehydration can be thirst, headache, hard stools or dizziness upon sitting or standing up. You should contact your surgeons office if you are unable to drink enough fluid to stay hydrated. --     General Care after Surgery   No lifting over 15 pounds for four weeks.  No driving while taking the pain medication (about seven to 10 days).  No tub baths, swimming or hot tubs until incisions are healed (about two weeks).  You may shower. Clean incisions daily /gently with soap and check incisions for signs of infection:  -- Redness around incision. -- Swelling at site. -- Drainage with an foul odor (pus). -- Increase tenderness around incision.  Take your temperature and resting pulse in the morning and evening. Record on tracking form  given to you. Call if your temperature is greater than 101 or your pulse rate is greater than 115.  Please contact your surgeon if you are having excessive abdominal pain (that lasts longer than  four hours and does not improve with prescribed pain medication), vomiting or shortness of breath.  Get up and move -- do not sit in one place for more than an hour.  You need to WALK (EXERCISE) for 30 minutes per day.   -- Walking around your house does not count. -- Bike, treadmill and elliptical are OK. -- NO weight lifting or sit ups.  If constipated take an adult dose of Miralax (available over the counter). Contact the doctors  office if Miralax doesnt help.  You may swallow pills starting the day after surgery as long as they fit inside this Platinum:   Continue to use your incentive spirometer (breathing machine) for the next couple of weeks to  help prevent pneumonia. Temperature/Heart Rate Log  Take your temperature and heart rate (pulse) twice a day for 14 days. Take both in the morning and  evening at about the same time each day (when you wake up and before you go to bed when you  are relaxed). Please contact your doctors office if your:   Temperature is higher than 101 degrees.  Heart rate (pulse) is higher than 115 beats per minute  (normal heart rate is 60 to 100 beats per minute). How to Take Your Heart Rate (Pulse)   Turn your left hand so that your palm is face-up.  With the index and middle fingers of your right  hand, draw a line from the base of your thumb to  just below the crease in your wrist. Your fingers  should nestle just to the left of the large tendon that  pops up when you bend your wrist toward you.  Dont press too hard, that will make the pulse go  away. Use gentle pressure.  Wait. It can take several seconds -- and several micro-adjustments in the placement of your two  fingers on your wrist -- to find your pulse. Just keep moving your fingers down or up your wrist  in small increments (and pausing for a few seconds) until you find it. How to Take Your Pulse Rate   Find a watch with a second hand and place it on your right wrist or on the table next  to your left hand.  After finding your pulse, count the number of beats for 20 seconds.  Multiply by three to get your heart rate, or beats per minute  (or just count for 60 seconds for a math-free option).    Normal, resting heart rate is about 60 to 100 beats per minute. -- 46 --  PATIENT GUIDE TO BARIATRIC SURGERY    Lovenox Self Injection Guide  Prepare  Step 1: Wash and dry your hands thoroughly. Step 2: Sit or lie in a comfortable position and choose an area  on the right or left side of the abdomen at least two inches away  from the belly button. Step 3: Clean the injection site with an alcohol swab and let dry. Inject  Step 4: Remove the needle cap by pulling it straight off the syringe and  discard it in a sharps . Step 5: With your other hand, pinch an inch of the cleansed area to  make a fold in the skin. Insert the full length of the needle straight  down -- at a 90? angle -- into the fold of skin. -- 48 --  PATIENT GUIDE TO BARIATRIC SURGERY    Step 6: Press the plunger with your thumb until the syringe is empty. Then pull the needle straight out and release the skin fold. Dispose  Step 7: Point the needle down and away from yourself and others,  and then push down on the plunger to activate the safety shield. Step 8: Place the used syringe in the sharps . Do NOT expel the air bubble from the syringe before the injection. Administration should be alternated between the left and right abdominal wall. The whole length  of the needle should be introduced into a skin fold held between the thumb and forefinger; the  skin fold should be held throughout the injection. To minimize bruising, do not rub the injection  site after completion of the injection. Questions about LOVENOX? Call [phone]  -- 49 --  PATIENT GUIDE TO BARIATRIC SURGERY    9. DIET AND LIFESTYLE  Key Diet Principles Following Bariatric Surgery   Begin each meal with soft moist high protein foods (i.e. chicken, turkey, yogurt, tuna, eggs,  cottage cheese, other fish and seafood).  Consume a minimum of 64 ounces of fluid each day to prevent dehydration. No straws.  No food and fluid together.  Stop drinking 30 minutes before a meal. You may begin fluids again  30 minutes after you finish a meal.   Eat very slowly and chew all foods completely.  Keep portions small.  No simple sugars or high fat foods. No carbonated beverages. No caffeine.  Eat three meals per day. No skipping. Avoid snacking between meals.  No alcohol. No smoking.  Two Flintstones Complete Chewable vitamins each day. Take one in the morning and one at night.  1,500 milligrams Calcium Citrate per day in separate dosages.  Vitamin D 3: 5,000 IU taken per day.  Vitamin B-12: Take 1,000 micrograms sublingually daily.  Iron: 60 milligrams per day from [facility].  Protein supplements of your choice. Must be low sugar (0 to 3 grams), low fat (0 to 3 grams) and  provide at least 35 to 40 grams of protein each day. You need 60 to 70 grams of protein (food  and supplements) each day.  Minimum of 30 minutes of physical activity daily. Do not gerry NSAIDS . Do not take Steroids without your surgeons permission. -- 50 --  PATIENT GUIDE TO BARIATRIC SURGERY    Your Priorities After Surgery  ? Fluid: 64 ounces of fluid per day. ? Protein: 60 to 70 grams of protein per day. ? Walk every day. Clear Liquid Diet  One week of clear liquids: minimum of 64 ounces of fluid per day. Fluid:   Zero calorie liquids.  No caffeine.  No carbonation.  No sugary drinks.  No alcohol.  No straws. Food   Protein drinks.  Less than 3 grams of sugar and  3 grams of fat per serving.  Protein drink should provide you with  60 to 70 grams of protein. Soft Protein Diet  Five weeks of soft protein (1 ounce for soft protein, 3 ounces of yogurt/cottage cheese). Three meals per day and 1 protein shake. Protein shakes should provide you with 30 grams of  protein on the soft protein diet. Slow transition:   First week on soft protein diet -- focus on yogurt, cottage cheese, eggs, vegetarian refried beans,  black beans, kidney beans and white beans.  (NO BAKED BEANS.)   Second through fourth week on soft protein diet -- focus on yogurt, cottage cheese, eggs,  canned tuna, canned chicken, tilapia and fish (needs to be soft enough to be cut up with a fork).  Fifth week on soft protein diet -- focus on yogurt, cottage cheese, eggs, canned tuna, canned  chicken, tilapia, fish, salmon, chicken breast or turkey. Fluid is your #1 Priority! Continue clear liquids between meals. You will need 64 ounces of fluid per day. Fluids that you can have include:   Water.  Zero calorie liquids. You will need to sip throughout the day and should therefore have a water bottle with you at all  times! No liquids with your meals. Stop 30 minutes before a meal and wait 30 minutes after a meal.  No straws. Zero calorie liquids. No caffeine. No carbonation. No sugary drinks. No alcohol. -- 51 --  PATIENT GUIDE TO BARIATRIC SURGERY    Protein  You will need 60 to 70 grams of protein per day.  60 to 70 grams of protein shakes when on the clear liquid diet (two to three shakes per day).  30 to 50 grams of protein shakes when on the soft protein diet (one shake per day). Eat Three Times Per Day  You will need to eat three times per day. My planned times are:  _________________________________________________________  _________________________________________________________  _________________________________________________________  Nausea, Vomiting, Stomach Pain  If you have problems with nausea, vomiting or stomach pain, try:   Eating slowly: 20 to 30 minutes per meal.   Chewing food thoroughly: 20 to 30 chews before food is swallowed.  Small portions: measure portions in medicine cup.  Stopping before feeling full.  AVOIDING SUGAR and FRIED FOOD: sugar will cause dumping syndrome and lead to weight gain. Exercise  I will need to get a minimum of 30 minutes of exercise per day or 150 minutes of exercise per week.    Walking, swimming, biking or elliptical.   Find something you enjoy! Vitamins  After surgery, you will need to take the following vitamins for the rest of your life -- FOREVER.  Vitamin D 3: 5,000 IU per day.  Calcium Citrate: 1,500 milligrams, taken separately.  Flintstones Complete: two per day, taken separately.  Sublingual Vitamin B-12: 1,000 micrograms daily.  Iron for menstruating women or patients with a history of low iron: 65 milligrams daily. We recommend going to www.[facility]. [employer] and purchasing iron from there. The lemon-lime has 60 milligrams. This iron is better absorbed than over-the-counter iron. -- 52 --  PATIENT GUIDE TO BARIATRIC SURGERY    Clear Liquid Log  Getting your fluid in is top priority during this week. Fluids (MINIUM of 64 ounces per day):  ? 8 oz. ? 8 oz. ? 8 oz. ? 8 oz. ? 8 oz. ? 8 oz. ? 8 oz. ? 8 oz. ? 8 oz. ? 8 oz. ? 8 oz. ? 8 oz. Flintstones Complete Chewable: ? a.m. ? p.m. Calcium Citrate (1,500 milligrams/day):  Pill form  ? Two crushed pills (morning) ? Two crushed pills (afternoon) ? Two crushed pills (evening)  OR Upcal D (powder)  ? One pack/scoop ? One pack/scoop ? One pack/scoop  OR Celebrate Chewable Vitamins (500 mg each) or Bariatric Advantage Chewables (500 mg)  ? One chewable (morning) ? One chewable (afternoon) ? One chewable (evening)  OR Liquid Calcium Citrate  ? 1 tbsp. Calcium Citrate ? 1 tbsp. Calcium Citrate ? 1 tbsp. Calcium Citrate  Vitamin D3: ? 5,000 IU daily. Vitamin B-12: ? 1,000 micrograms per day. Iron (menstruating women or patients with a history of low iron):  ? 60 milligrams per day from [facility] [employer]. Protein drinks (protein drinks should be under 3 grams of sugar and 3 grams of fat). Protein shake (60 grams per day): ? a.m. ? p.m. Exercise: ? 30 to 40 minutes per day. -- 53 --  PATIENT GUIDE TO BARIATRIC SURGERY    Bariatric Soft and Moist Diet Shopping List   alcium Citrate (1,500 milligrams/day):  Pill form  ? Two crushed pills (morning) ?  Two crushed pills (afternoon) ? Two crushed pills (evening)  OR Upcal D (powder)  ? One pack/scoop ? One pack/scoop ? One pack/scoop  OR Celebrate Chewable Vitamins (500 mg each) or Bariatric Advantage Chewables (500 mg)  ? One chewable (morning) ? One chewable (afternoon) ? One chewable (evening)  OR Liquid Calcium Citrate  ? 1 tbsp. Calcium Citrate ? 1 tbsp. Calcium Citrate ? 1 tbsp. Calcium Citrate  Vitamin D3: ? 5,000 IU daily  Vitamin B-12: ? 1,000 micrograms per day  Vitamin B 1 100 mg daily  Iron (menstruating women or  patients with a history of low iron):  ? 60 milligrams per day  from [facility] [employer]  Protein drinks (protein drinks should be under  3 grams of sugar and 3 grams of fat). Protein shake (30 to 40 grams per day):  ? a.m. ? p.m. Exercise: ? 30 to 40 minutes per  Educated on Diet Progression    [facility] Gastric Bypass and Sleeve Dietary Progression    Patient Name:   Date of Surgery: Ice Chips start once admitted on floor. Begin Bariatric Clear Liquid Diet on:     Clear Liquid Diet: 64 oz. of fluid per day  o Low calorie, low sugar, non-carbonated beverages  - Water, Crystal Light, Propel Water, Sugar Free Jell-O, Sugar Free Popsicles, Bouillon  - Start protein supplement during this stage. (60-70 grams per day)  - Getting your fluid in and staying hydrated is your #1 priority! - The clear liquid diet will last for 7 days. Begin Bariatric Soft and Moist on:   - This stage of the diet will last for 5 weeks, unless otherwise instructed by your surgeon. - Begin:  1 week post-op   - End:  6 weeks post-op (or when you follow up with the Registered Dietitian)    - Soft, moist, high protein foods: 3 meals per day plus protein supplements. o   Portions should emphasize on soft protein. o Portions will be a MAXIMUM of:  o  1 ounce of solid food  o  2-3 ounces of cottage cheese and yogurt.   o Protein supplements should be between meals and provide 30-40 grams per day during soft protein diet.  o Continue to get 64 ounces of fluid in per day. - Protein foods that are ok on the Soft and Moist Diet include:  o Slow transition:  o 1st week on soft protein should focus on: Yogurt, cottage cheese, eggs  o 2nd -4th  week on soft protein diet should focus on: yogurt, cottage cheese, eggs, canned tuna, canned chicken, tilapia, fish (needs to be soft enough to be cut up with a fork)  o 5th week on soft protein diet should focus on: Yogurt, cottage cheese, eggs, canned tuna, canned chicken, tilapia, fish, salmon, chicken breast, or turkey. Remember to continue to get 64 ounces of fluid daily on ALL Stages. To be advanced to Bariatric Maintenance Stage of the bariatric diet, follow up with the dietitian 6 weeks post-op, around:         For any additional questions, please refer to your blue binder that was provided to you at the start of the bariatric program.

## 2021-08-05 NOTE — OP NOTES
[address] [place] [address]   OPERATIVE REPORT    Name:  Duran Parker  MR#:   [id number]  :  1980  ACCOUNT #:  [de-identified]  DATE OF SERVICE:  2021    PREOPERATIVE DIAGNOSES:  1. Clinically severe obesity with a body mass index of 48 with obesity related comorbidities of stress urinary incontinence, obstructive sleep apnea, and weight-related arthropathy. 2.  Suspected hepatic steatosis. POSTOPERATIVE DIAGNOSES:  1. Clinically severe obesity with a body mass index of 48 with obesity related comorbidities of stress urinary incontinence, obstructive sleep apnea, and weight-related arthropathy. 2.  Hepatomegaly with morphologic appearance of hepatic steatosis. PROCEDURES PERFORMED:  1. Laparoscopic Sathya-en-Y gastric bypass utilizing a 15 mL separate tubularized gastric pouch based on the lesser curvature of the stomach, a 870 cm retrocolic retrogastric Sathya limb, and a 40 cm biliopancreatic limb. 2.  Laparoscopic left hepatic lobe wedge biopsy. SURGEON:  Niya Hook. Noe Davila DO.    FIRST ASSISTANT:  Bonifacio Major NP.    ANESTHESIA:  General endotracheal supplemented at the conclusion of the operative procedure with local infiltration of the operative sites utilizing 266 mg of Exparel diluted in 50 mL of normal saline solution. COMPLICATIONS:  None. SPECIMENS REMOVED:  Left hepatic lobe wedge biopsy. IMPLANTS: None. ESTIMATED BLOOD LOSS:  Less than 20 mL. OPERATIVE FINDINGS:  Hepatomegaly with morphologic appearance of hepatic steatosis. INDICATION FOR SURGICAL PROCEDURE:  This is a 40-year-old black female who has failed medical management of her clinically severe obesity and, after investigating surgical options, has elected to pursue laparoscopic, potentially open Sathya-en-Y gastric bypass to achieve definitive durable weight loss on a personal level with expected resolution of her obesity related comorbidities.   The patient in addition was consented for laparoscopic, potentially open left hepatic lobe wedge biopsy to definitively histologically characterize the preop suspicion of hepatic steatosis. The risks and benefits of operative versus nonoperative potential alternatives and potential complications up to and including death were extensively discussed with the patient prior to the surgical procedure, who voiced her understanding and wished to proceed. DESCRIPTION OF PROCEDURE:  The patient received Ancef for antibiotic prophylaxis and Lovenox for DVT prophylaxis in the preoperative staging area. After voiding and then signing her operative permit, which was properly witnessed, she was then transported to the operating room where she was placed in the supine position on the operating table. SCDs were placed and inflated prior to the induction of general endotracheal anesthesia. A 34 Maori orogastric tube was then placed atraumatically to gravity drainage for gastric decompression and utilization as a sizing template for construction of the tubularized gastric pouch based on the lesser curvature of the stomach. The abdomen was then prepped and draped in the usual sterile fashion. A time-out procedure was performed according to protocol and agreed upon by all personnel in the operating suite. A transverse 12 mm cutaneous incision was made 2 cm below the umbilicus in the midline through which 12 mm Optiview trocar and cannula were placed into the peritoneal cavity under direct vision utilizing a 10 mm, 0 degree laparoscope. The laparoscope and trocar were removed. The abdomen was insufflated with carbon dioxide gas never exceeding a pressure of 15 mmHg, and a 30 degree, 10 mm laparoscope was placed and utilized for remainder of the procedure. The remaining ports were then placed under direct vision through transverse cutaneous incisions utilizing Optiview trocars and cannulas.   The second was a 5 mm incision in the left anterior axillary line two fingerbreadths below the left costal margin, the third a 12 mm incision midway between the left upper quadrant and the supraumbilical port, and the fourth in the right paramedian location 8 cm from the midline utilizing a 12 mm incision. After placing the appropriately sized Optiview trocars and cannulas, a brief exploration of the upper abdomen was notable only for hepatomegaly with morphologic appearance of hepatic steatosis. The omentum and transverse colon were reflected superiorly. The ligament of Treitz was identified, and 40 cm distal to the ligament of Treitz, the jejunum was transected with a triple-load stapling device 60 mm in length loaded with 2.5 mm staples. The mesentery was then divided with the Harmonic scalpel down to the mesenteric root. The Sathya limb was measured to be 100 cm in length, and at this point, on its antimesenteric border, an enterotomy was created with the Harmonic scalpel. A similar antimesenteric enterotomy was created 2 cm proximal to the staple line of the biliopancreatic limb, and a stapled side-to-side functional end-to-side jejunojejunostomy was constructed utilizing a triple-load stapling device 60 mm in length loaded with 2.5 mm staples and introducing one arm of the stapling device into each of the respective limbs prior to firing it on their antimesenteric borders. The remaining enteric defect was then closed with a running full-thickness 3-0 Vicryl suture, and the mesenteric defect was closed with a running 2-0 silk suture. The ligament of Treitz was re-identified. Just anterior to the ligament of Treitz, a fenestration was created through the mesocolon into the lesser sac utilizing the Harmonic scalpel, and the Sathya limb was then placed through this mesocolonic fenestration into the lesser sac. The omentum and transverse colon were then reflected back to their normal anatomic positions. The sherly was identified along the lesser curvature of the stomach.   Just inferior to the sherly, along the greater curvature of the stomach, the omentum was  from the greater curvature of the stomach utilizing the Harmonic scalpel until the Sathya limb was visualized within the lesser sac. A transverse 5 mm cutaneous incision was then made one-third the distance from the xiphisternal junction to the umbilicus in the midline through which a 5 mm trocar without a cannula was placed into the peritoneal cavity. This was then removed and replaced with a 5 mm  atraumatic-grasping forceps, which was utilized in conjunction with a self-retaining retractor to elevate the left lobe of the liver and provide hiatal exposure. The peritoneum overlying the angle of His was then opened with the Harmonic scalpel. Five centimeters distal to the GE junction, along the lesser curvature of the stomach, the neurovascular elements of the lesser omentum were  from the gastric wall utilizing the Harmonic scalpel. Completion of this lesser curvature fenestration into the lesser sac was accomplished with an esophageal retractor introduced through the omental fenestration along the greater curvature of the stomach posterior to the stomach. The 29 Beninese orogastric tube was then retracted into the esophagus. A triple-load stapling device 60 mm in length loaded with 3.5 mm staples was placed into the lesser curvature fenestration and oriented perpendicular to the lesser curvature 5 cm distal to the GE junction prior to firing two-thirds the length of the staple load. The 29 Beninese orogastric tube was then advanced and utilized as a sizing template for the construction of the tubularized gastric pouch based on the lesser curvature of the stomach. The vertical aspect of the pouch was initiated with a triple-load stapling device 60 mm in length loaded with 3.5 mm staples utilizing two-thirds the length of the staple load.   The remainder of the pouch was constructed with sequential firings of a triple-load stapling device 60 mm in length loaded with 3.5 mm staples ending the transection at the angle of His. It should be noted that the initial firing of the full-length 3.5 mm staple load utilized an Ethicon staple line reinforcement strip and this then created a 15 mL separate tubularized gastric pouch based on the lesser curvature of the stomach. The Sathya limb was elevated posterior to the stomach in a retrogastric position where a tension-free hand-sewn two-layered gastrojejunostomy was constructed. The geometric orientation of the gastrojejunostomy was at the distal aspect of the tubularized gastric pouch at the antimesenteric border of the Sathya limb 2 cm distal to the staple line. The posterior row of running seromuscular 3-0 Vicryl suture was placed. A transverse 12 mm gastrotomy was made at the distal aspect of the tubularized gastric pouch with an adjacent antimesenteric 12 mm Sathya limb enterotomy. The running inner layer of 3-0 Vicryl was initiated in the left lateral posterior aspect of the anastomosis, running across the posterior aspect of the anastomosis, running around the right lateral aspect of the anastomosis to the anterior midline. A second full-thickness 3-0 Vicryl suture was initiated adjacent to the origin of the first and running around the left lateral aspect of the anastomosis to the anterior midline. The 29 Ethiopian orogastric tube was advanced across the gastrojejunal anastomosis into the Sathya limb prior to tying the running inner layer of full-thickness 3-0 Vicryl sutures together anteriorly. The gastrojejunal anastomosis was completed anteriorly utilizing a running seromuscular 3-0 Vicryl suture. The 29 Ethiopian orogastric tube was removed, and after assuring there was adequate redundancy of the Sathya limb above the level of the mesocolon, the omentum and transverse colon were reflected superiorly.   The space through which a Bartlett's hernia might occur was closed with a running 2-0 silk suture and the mesocolonic defect was closed with a series of simple interrupted 2-0 silk sutures. The omentum and transverse colon were then reflected back to their normal anatomic positions. The Sathya limb was noted to be viable with adequate redundancy above the level of the mesocolon. There was no tension noted at the gastrojejunal anastomosis. The self-retaining retractor and the 5 mm atraumatic-grasping forceps were then removed. The free edge of the left lobe of the liver was retracted in such a fashion so that a 1.5 cm wedge-shaped biopsy could be obtained  sharply for definitive histologic characterization. Hemostasis was then established with electrocautery. Tthe laparoscope was shifted to the left mid abdominal port to allow closure of the supraumbilical fascial trocar defect with a suture passing device and #2 Vicryl suture. All operative sites were inspected and noted to be hemostatic. The abdomen was then desufflated off carbon dioxide and the trocars were removed under direct vision. The fascial sutures were tied. The wounds were irrigated with normal saline solution and closure of the skin was accomplished with a series of simple interrupted buried deep dermal 4-0 Monocryl sutures. The wounds were infiltrated with 266 mg of Exparel diluted in 50 mL of normal saline solution. Steri-Strips were applied, as were sterile dressings. Sponge and needle counts were correct both during and at the completion of the procedure. The patient tolerated the procedure without operative complications. She subsequently was extubated and transported to the recovery room in awake, alert, and stable condition. Estimated blood loss was less than 20 mL. The patient received 900 mL of crystalloid during the procedure. The operative start time was 07:50 and completion time was 09:24.       DO MARIO ALBERTO [email or web address]  D:  08/04/2021 10:06  T:  08/04/2021 23:30  JOB #:  [id number]

## 2021-08-05 NOTE — PROGRESS NOTES
Discharge order noted for today. Orders reviewed. No needs identified at this time.  remains available if needed.   Jerrica Solomon RN - Outcomes Manager  [phone]

## 2021-08-05 NOTE — PROGRESS NOTES
Surgery Progress Note    8/5/2021    Admit Date: 8/4/2021    Subjective:     Patient has complaints of pain and is controlled with current plan. Patient has been ambulating in halls. She reports no nausea and no vomiting. Bowel Movements: None    Objective:     Blood pressure (P) 128/87, pulse (P) 71, temperature (P) 97.7 °F (36.5 °C), resp. rate (P) 16, height 5' 7\" (1.702 m), weight 139.7 kg (308 lb), last menstrual period 02/01/2021, SpO2 (P) 99 %, not currently breastfeeding.     08/05 0701 - 08/05 1900  In: 90 [P.O.:90]  Out: -     08/03 1901 - 08/05 0700  In: 2177.5 [P.O.:585; I.V.:1592.5]  Out: 1020 [Urine:1000]    EXAM: GENERAL: alert, pleasant, no distress   HEART: regular rate and rhythm   LUNGS: clear to auscultation   ABDOMEN:  Soft, obese, appropriate incisional tenderness, +BS, non-distended, surgical incisions clean, dry, no erythema or drainage   EXTREMITIES: warm, well perfused    Data Review    Recent Results (from the past 24 hour(s))   CBC W/O DIFF    Collection Time: 08/05/21  4:10 AM   Result Value Ref Range    WBC 6.7 4.6 - 13.2 K/uL    RBC 4.42 4.20 - 5.30 M/uL    HGB 10.9 (L) 12.0 - 16.0 g/dL    HCT 35.2 35.0 - 45.0 %    MCV 79.6 74.0 - 97.0 FL    MCH 24.7 24.0 - 34.0 PG    MCHC 31.0 31.0 - 37.0 g/dL    RDW 16.3 (H) 11.6 - 14.5 %    PLATELET 417 262 - 017 K/uL    MPV 10.1 9.2 - 79.3 FL   METABOLIC PANEL, BASIC    Collection Time: 08/05/21  4:10 AM   Result Value Ref Range    Sodium 136 136 - 145 mmol/L    Potassium 3.5 3.5 - 5.5 mmol/L    Chloride 105 100 - 111 mmol/L    CO2 27 21 - 32 mmol/L    Anion gap 4 3.0 - 18 mmol/L    Glucose 73 (L) 74 - 99 mg/dL    BUN 7 7.0 - 18 MG/DL    Creatinine 0.63 0.6 - 1.3 MG/DL    BUN/Creatinine ratio 11 (L) 12 - 20      GFR est AA >60 >60 ml/min/1.73m2    GFR est non-AA >60 >60 ml/min/1.73m2    Calcium 8.7 8.5 - 10.1 MG/DL   MAGNESIUM    Collection Time: 08/05/21  4:10 AM   Result Value Ref Range    Magnesium 2.0 1.6 - 2.6 mg/dL       Assessment: Andre Fatima is a 41 y.o. female,  day 1 status post   1. Laparoscopic Sathya-en-Y gastric bypass utilizing a 15 mL separate tubularized gastric pouch based on the lesser curvature of the stomach, a 620 cm retrocolic retrogastric Sathya limb, and a 40 cm biliopancreatic limb. 2.  Laparoscopic left hepatic lobe wedge biopsy. .  Condition: good    Plan:   -Ambulate every four hours  -Pain managed prior to d/c   -Nausea managed prior to d/c   -Advance to Clear liquid Gastric Bypass Diet, if able to tolerate clear liquid diet (with pro shake) 4oz per hour for 3 hours prior to d/c    If patient continue to progress d/c home later today     Venus Montana, TERRA  8:29 AM  8/5/2021

## 2021-08-05 NOTE — PROGRESS NOTES
8/5/2021    Patient was educated on lovenox injections. 1. Wash and dry hands  2. Sit or lie in comfortable position  3. Choose an area around love handles 3 inches away from [facility]  4. Clean site with alcohol and let dry  5. Remove needle cap pull straight out  6. With your other and pinch an inch of the cleanses area and insert full length of needle straight in 90 degree  7. Press the plunger with your thumb slowly until syringe is empty. Pull needle out and point away from you. Push down on plunger to activate the safety shield. Push hard  8. Place the syringe in a plastic bottle and dispose in trash. 9. Rotate sites  10. Do not remove air from syringe before injection  11.  Do not rub the area

## 2021-08-05 NOTE — DISCHARGE SUMMARY
Bariatric Surgery Discharge Progress Note    Admission Date: 8/4/2021    Discharge Date: 8/5/2021    PREOPERATIVE DIAGNOSES:  1. Clinically severe obesity with a body mass index of 48 with obesity related comorbidities of stress urinary incontinence, obstructive sleep apnea, and weight-related arthropathy. 2.  Suspected hepatic steatosis.     POSTOPERATIVE DIAGNOSES:  1. Clinically severe obesity with a body mass index of 48 with obesity related comorbidities of stress urinary incontinence, obstructive sleep apnea, and weight-related arthropathy. 2.  Hepatomegaly with morphologic appearance of hepatic steatosis.     PROCEDURES PERFORMED:  1. Laparoscopic Sathya-en-Y gastric bypass utilizing a 15 mL separate tubularized gastric pouch based on the lesser curvature of the stomach, a 090 cm retrocolic retrogastric Sathya limb, and a 40 cm biliopancreatic limb. 2.  Laparoscopic left hepatic lobe wedge biopsy. Postop Complications: none    Hospital Course:  Patient was admitted on 8/4/2021 for scheduled bariatric surgery. Operation was without significant complication. Patient admitted to the floor postoperatively, monitored as per protocol. Diet sequentially advanced beginning POD 1, pain medications transitioned to oral during the hospital course. Currently the patient is afebrile, vital signs stable, tolerating a clear liquid diet with protein supplementation, voiding spontaneously, ambulatory with adequate pain control with oral medications and clear surgical sites without evidence of infection. Discharge Diet:  Clear Liquid Bariatric Diet for 7 days, then soft moist protein diet for 5 weeks    Discharge Medications:  Current Discharge Medication List      START taking these medications    Details   oxyCODONE IR (ROXICODONE) 5 mg immediate release tablet Take 1 Tablet by mouth every four (4) hours as needed for Pain for up to 3 days.  Max Daily Amount: 30 mg.  Qty: 12 Tablet, Refills: 0  Start date: 8/5/2021, End date: 8/8/2021    Associated Diagnoses: Post-op pain      ondansetron (ZOFRAN ODT) 4 mg disintegrating tablet Take 1 Tablet by mouth every eight (8) hours as needed for Nausea or Vomiting. Qty: 12 Tablet, Refills: 0  Start date: 8/5/2021         CONTINUE these medications which have NOT CHANGED    Details   multivitamin (ONE A DAY) tablet Take 1 Tablet by mouth daily. venlafaxine-SR (EFFEXOR-XR) 150 mg capsule TAKE 2 CAPSULES BY MOUTH EVERY DAY IN THE MORNING      ARIPiprazole (ABILIFY) 15 mg tablet       traZODone (DESYREL) 100 mg tablet TAKE 2 TABLET BY MOUTH EVERY NIGHT AS NEEDED FOR SLEEP.      enoxaparin (Lovenox) 40 mg/0.4 mL 0.4 mL by SubCUTAneous route daily.   Qty: 7 Syringe, Refills: 0  Start date: 8/4/2021    Comments: surg 8/4         STOP taking these medications       ferrous sulfate 324 mg (65 mg iron) tablet Comments:   Reason for Stopping:         Linzess 145 mcg cap capsule Comments:   Reason for Stopping:                 Bariatric Chewable vitamins, 2 orally daily for life  Calcium Citrate 1500 mg orally daily for life  Vitamin B12 1000 micrograms sublingual daily for life  Vitamin D3 5,000 units orally daily for life   Vitamin B1 100 mg orally daily for life    Discharge disposition: home    Discharge condition: stable      Local wound care with daily showers, keep wounds clean and dry     Activity: as desired, no lifting, pushing, or pulling greater than 15lbs or situps for 30 days     Special Instructions:            - No driving until activity is not influenced by incisional pain and off narcotics            - No bath or hot tub until wounds are healed            - Check pulse and temperature twice daily for 10 days            - Notify [employer] for a Temp >100.5 or Pulse>115     Follow-up with your surgeon in 2 weeks, call office for appointment [phone] ([facility]) [address])

## 2021-08-09 ENCOUNTER — TELEPHONE (OUTPATIENT)
Dept: MEDSURG UNIT | Age: 41
End: 2021-08-09

## 2021-08-09 NOTE — TELEPHONE ENCOUNTER
Pt states that she is doing fine. Pt is getting in 64 oz of fluid daily. Pt is exercising for 30 minutes a day.

## 2021-08-11 ENCOUNTER — OFFICE VISIT (OUTPATIENT)
Dept: SURGERY | Age: 41
End: 2021-08-11
Payer: COMMERCIAL

## 2021-08-11 VITALS
SYSTOLIC BLOOD PRESSURE: 136 MMHG | WEIGHT: 293 LBS | TEMPERATURE: 98.1 F | HEART RATE: 88 BPM | HEIGHT: 66 IN | RESPIRATION RATE: 20 BRPM | BODY MASS INDEX: 47.09 KG/M2 | DIASTOLIC BLOOD PRESSURE: 84 MMHG

## 2021-08-11 DIAGNOSIS — Z09 POSTOPERATIVE EXAMINATION: Primary | ICD-10-CM

## 2021-08-11 PROCEDURE — 99024 POSTOP FOLLOW-UP VISIT: CPT | Performed by: NURSE PRACTITIONER

## 2021-08-11 RX ORDER — LANOLIN ALCOHOL/MO/W.PET/CERES
1000 CREAM (GRAM) TOPICAL DAILY
COMMUNITY

## 2021-08-11 RX ORDER — LANOLIN ALCOHOL/MO/W.PET/CERES
CREAM (GRAM) TOPICAL DAILY
COMMUNITY

## 2021-08-11 RX ORDER — CHOLECALCIFEROL (VITAMIN D3) 125 MCG
CAPSULE ORAL
COMMUNITY

## 2021-08-11 RX ORDER — CALCIUM CARBONATE/VITAMIN D3 600 MG-125
TABLET ORAL
COMMUNITY

## 2021-08-11 NOTE — PROGRESS NOTES
Maddison Willard is a 41 y.o. female that presents today to follow up post  LGBP  preformed on 8/4/2021. Pt says pain level is at a 0 on a scale from 1-10. Pt is drinking 64oz of fluid daily. Pt is currently eating broths,jello, popsicle,propell water. Pt says the amount of time spent exercising is walking 30 minutes daily. Body mass index is 48.42 kg/m². 1. Have you been to the ER, urgent care clinic since your last visit? Hospitalized since your last visit? No    2. Have you seen or consulted any other health care providers outside of the [address] [place] [address] since your last visit? Include any pap smears or colon screening.  No

## 2021-08-11 NOTE — PROGRESS NOTES
Postop Progress Note    Subjective    Evangelina Montoya presents to the office for concerns about epigastric trocar incision site. She reports some light yellow drainage from area and wound still looks open. Denies fever or tachycardia. Objective    Visit Vitals  /84 (BP 1 Location: Left upper arm, BP Patient Position: At rest, BP Cuff Size: Adult)   Pulse 88   Temp 98.1 °F (36.7 °C) (Oral)   Resp 20   Ht 5' 6\" (1.676 m)   Wt 136.1 kg (300 lb)   LMP 02/01/2021 (Exact Date)   BMI 48.42 kg/m²     General: alert, cooperative and no distress  Incisions: healing well, no erythema, drainage, or swelling. Abd: + BS, abd soft and nontender, no distension    Assessment  Doing well postoperatively. Appropriate surgical incision healing. Plan  1. Continue current medications  2. Wound care discussed, keep area clean with soap and water. 3. Pt is to increase activities as tolerated  4. Continue bariatric diet, start soft protein diet tomorrow. 5. Proceed with 2 week follow up with Dr. Taylor Robles next week.     Farnaz Trevizo NP

## 2021-08-19 ENCOUNTER — OFFICE VISIT (OUTPATIENT)
Dept: SURGERY | Age: 41
End: 2021-08-19
Payer: COMMERCIAL

## 2021-08-19 VITALS
DIASTOLIC BLOOD PRESSURE: 94 MMHG | WEIGHT: 288 LBS | HEART RATE: 82 BPM | HEIGHT: 66 IN | RESPIRATION RATE: 20 BRPM | TEMPERATURE: 97.5 F | SYSTOLIC BLOOD PRESSURE: 139 MMHG | BODY MASS INDEX: 46.28 KG/M2

## 2021-08-19 DIAGNOSIS — K91.2 POSTOPERATIVE INTESTINAL MALABSORPTION: Primary | ICD-10-CM

## 2021-08-19 PROCEDURE — 99024 POSTOP FOLLOW-UP VISIT: CPT | Performed by: SURGERY

## 2021-08-19 NOTE — PROGRESS NOTES
Bindu Hernadez is a 41 y.o. female that presents today to follow up post  LGBP  preformed on 8/4/2021 Pt says pain level is at a 0 on a scale from 1-10. Pt is drinking 80 oz of fluid daily. Pt is currently eating protein shakes,eggs,jello, crystal light, pop sickle . Pt says the amount of time spent exercising is walking 30 minutes daily. Body mass index is 46.48 kg/m². 1. Have you been to the ER, urgent care clinic since your last visit? Hospitalized since your last visit? No    2. Have you seen or consulted any other health care providers outside of the [address] [place] [address] since your last visit? Include any pap smears or colon screening.  No

## 2021-08-19 NOTE — PROGRESS NOTES
Bariatric Follow-Up Evaluation    Trinity Miranda is a 41 y.o. black female who presents in follow-up status post laparoscopic gastric bypass, liver biopsy August 4, 2021 noting expected decreasing incisional discomfort no longer requiring analgesics and otherwise without complaint. Compliant with intolerant of an early post gastric bypass with appropriate early satiety no specific food intolerances or pathologic emesis. The patient notes appropriate early satiety with no specific food intolerances or pathologic emesis and has not attempted high-density carbohydrates and therefore has not experienced dumping syndrome  Compliant with multivitamin, calcium citrate, vitamin B1, vitamin B12, and vitamin D supplementation. Activity: Walking 30 minutes daily  Comorbidities: Stress or incontinence, obstructive sleep apnea, weight related arthropathy-improved  Preoperative weight: 317  Current weight: 288. BMI: 47  Estimated weight loss: 146  Current weight loss: 29  Goal weight: 171  Percentage weight loss: 20% / 15 days    Weight Loss Metrics 8/19/2021 8/19/2021 8/11/2021 8/11/2021 8/4/2021 7/27/2021 7/15/2021   Pre op / Initial Wt 317 - 317 - - - 317   Today's Wt - 288 lb - 300 lb - 308 lb 317 lb   BMI - 46.48 kg/m2 - 48.42 kg/m2 48.24 kg/m2 - 51.17 kg/m2   Ideal Body Wt 134 - 134 - - - 134   Excess Body Wt 183 - 183 - - - 183   Goal Wt 171 - 171 - - - 171   Wt loss to date 29 - 17 - - - 0   % Wt Loss 0.2 - 0.12 - - - 0   80% .4 - 146.4 - - - 146.4       No Known Allergies    Current Outpatient Medications on File Prior to Visit   Medication Sig Dispense Refill    linaCLOtide (Linzess) 145 mcg cap capsule Take  by mouth Daily (before breakfast).  cyanocobalamin 1,000 mcg tablet Take 1,000 mcg by mouth daily.  thiamine HCL (Vitamin B-1) 100 mg tablet Take  by mouth daily.  cholecalciferol, vitamin D3, (Vitamin D3) 50 mcg (2,000 unit) tab Take  by mouth.       calcium-cholecalciferol, d3, (CALCIUM 600 + D) 600-125 mg-unit tab Take  by mouth.  multivitamin (ONE A DAY) tablet Take 1 Tablet by mouth daily.  venlafaxine-SR (EFFEXOR-XR) 150 mg capsule TAKE 2 CAPSULES BY MOUTH EVERY DAY IN THE MORNING      ARIPiprazole (ABILIFY) 15 mg tablet       traZODone (DESYREL) 100 mg tablet TAKE 2 TABLET BY MOUTH EVERY NIGHT AS NEEDED FOR SLEEP.  enoxaparin (Lovenox) 40 mg/0.4 mL 0.4 mL by SubCUTAneous route daily. (Patient not taking: Reported on 2021) 7 Syringe 0     No current facility-administered medications on file prior to visit. Past Medical History:   Diagnosis Date    Depression     Fibroids     IBS (irritable bowel syndrome)     Sleep apnea     uses cpap       Past Surgical History:   Procedure Laterality Date    HX BREAST REDUCTION Bilateral 2013    HX  SECTION  08/10/2002    HX LAP CHOLECYSTECTOMY  2002    HX LAP GASTRIC BYPASS  2021    HX TOTAL LAPAROSCOPIC HYSTERECTOMY  03/15/2021       Social History     Tobacco Use    Smoking status: Never Smoker    Smokeless tobacco: Never Used   Vaping Use    Vaping Use: Never used   Substance Use Topics    Alcohol use: Yes     Comment: socially    Drug use: Never       Family History   Problem Relation Age of Onset    Diabetes Mother     Hypertension Mother     Hypertension Father        ROS:  General: Negative for fevers, chills, night sweats, fatigue, weight loss, or weight gain. GI: Negative for abdominal pain, change in bowel habits, hematochezia, melena, or GERD. Integumentary: Negative for dermatitis or abnormal moles.     HEENT: Negative for visual changes, vertigo, epistaxis, dysphagia, or hoarseness    Cardiac: Negative for chest pain, palpitations, hypertension, edema, or varicosities    Resp: Negative for cough, shortness of breath, wheezing, hemoptysis, snoring, or reactive airway disease    : Negative for hematuria, dysuria, frequency, urgency, nocturia, or stress urinary incontinence    MSK:  Negative for weakness, joint pain, or arthritis    Endocrine: Negative for diabetes, thyroid disease, polyuria, polydipsia, polyphagia, poor wound, heat intolerance, or cold intolerance. Lymph/Heme: Negative for anemia, bruising, or history of blood transfusions. Neuro:  Negative for dizziness, headache, fainting, seizures, and stroke. Psychiatry:  Negative for anxiety or depression    Physical Exam    Visit Vitals  BP (!) 139/94 (BP 1 Location: Left upper arm, BP Patient Position: At rest, BP Cuff Size: Adult)   Pulse 82   Temp 97.5 °F (36.4 °C) (Oral)   Resp 20   Ht 5' 6\" (1.676 m)   Wt 130.6 kg (288 lb)   LMP 02/01/2021 (Exact Date)   BMI 46.48 kg/m²       Nursing note reviewed. General: 41 y.o. female is in no acute distress. Head: Normocephalic, atraumatic  Resp: Clear to auscultation bilaterally, no wheezing, rhonchi, or rales, excursions normal and symmetrical.  Cardio: Regular rate and rhythm, no murmurs, clicks, gallops, or rubs. No edema or varicosities. Abdomen: Obese, soft, nontender, nondistended, normoactive bowel sounds, no hernias, no hepatosplenomegaly,   Psych: Alert and oriented to person, place, and time.     August 4, 2021 pathology/liver biopsy: Steatosis no evidence of steatohepatitis    Impression    Status post laparoscopic gastric bypass August 8, 2021 with appropriate weight loss and comorbidity resolution        Plan    Formal bariatric nutrition evaluation for advancement of diet  Continue compliance with early post gastric bypass diet, exercise regimen, vitamin supplementation protocol, encourage monthly attendance at bariatric support group meetings  Follow-up November 2021 with labs for 3-month bariatric surgical evaluation

## 2021-09-16 ENCOUNTER — HOSPITAL ENCOUNTER (OUTPATIENT)
Dept: BARIATRICS/WEIGHT MGMT | Age: 41
Discharge: HOME | End: 2021-09-16

## 2021-09-16 ENCOUNTER — DOCUMENTATION ONLY (OUTPATIENT)
Dept: BARIATRICS/WEIGHT MGMT | Age: 41
End: 2021-09-16

## 2021-09-16 NOTE — PROGRESS NOTES
[facility]  POST-OP NUTRITION FOLLOW UP    Patient's Name: Shaun Meyers  YOB: 1980  Surgery Date: 8/3/21      Procedure: Gastric Bypass    Surgeon: Dr. Nighat Riley    Height: 5 f 7     Pre-Op Weight: 317     Current Weight: 278  Weight Lost: 39    BMI:  43.6    Attendance of support group:   When:   Why not:     Complications  Readmittance: None  Reoperations: None  Complications: None  IV Fluids: None  ER Trips: None    Problem Areas:   Nausea: None   Vomiting: She states when she eats too fast.  She states this was when she first started on eggs and ate too fat. Dumping Syndrome: She states she tried a brownie and felt feverish, shaky. Inadequate Protein: None, patient states she is getting 1 shake per day. Inadequate Fluids: None, patient states she is getting 80 ounces of fluid per day. Food Intolerance:   Hunger: Yes. She feels hungry. States it may be physical or brain hunger. Constipation:  She states she goes 2 x a week. Eating 3 Meals/Day: 4 x   Portion Size at Meals: 1.5     Protein from Food: Yes    Foods being consumed:  Breakfast: Time: 9:00-11:00 am:  1.5 eggs  Lunch: Time: 12:00-1:00 pm:   [place], tuna    Dinner:  Time: 5:00 pm:   [place], tuna fish. Shrimp    In-between eating: sugar free popsicles, broth, sugar free jello    Length of time for meals: 10-15 minutes    food/fluids: Yes, but not for the full 30 minutes.       Fluids: 80 oz/day   Types of Fluids: Propel, water    MVI: Flinstones Complete    Number/Day: BID   Taken Separately: Yes    Vitamin B1: Yes  Dosin mg    Calcium: Citrate    Calcium Dosin-3 x a day    Taken Separately: Yes    Vitamin B12: sublingual   Vitamin B12 Dosin mcg    Vitamin D: Yes     Vitamin D dosin IU    Iron: Per Dr. Nighat Riley, hold off until 3 month labs    Iron dosing:      Protein Supplement: Premier     Grams of Protein: 30   Mixed with:      Splitting Protein Drink in 1/2: Yes   Timing of Protein Drinks:   Patient is taking 7 days a week. Exercise: Cardio 1.5 hours daily, except Sunday. Comments:    Patient is doing well at 6 weeks. She states she is sticking to the 1 ounce of protein. She tried a brownie and got sick. I talked to her about the habits this can get into and advised her not to do this. She is exercising 1.5 hours per day. She has been instructed to increase her protein shake to 1.5 since she is experiencing hunger. She is doing very well with her vitamins and protein shakes. Will monitor her progress. Patient was educated on the importance of eating meat and vegetables only. I have talked with patient about the effects of carbohydrates, not only from a weight management perspective, but also what effects it could have on their blood sugar and what reactive hypoglycemia is. Diet Follow Up:  9 month class scheduled for: Patient will schedule closer to date.     Troy Cisneros [address]    9/16/2021

## 2021-10-27 ENCOUNTER — HOSPITAL ENCOUNTER (OUTPATIENT)
Dept: LAB | Age: 41
Discharge: HOME | End: 2021-10-27

## 2021-10-27 LAB — XX-LABCORP SPECIMEN COL,LCBCF: NORMAL

## 2021-10-27 PROCEDURE — 99001 SPECIMEN HANDLING PT-LAB: CPT

## 2021-10-28 LAB
ALBUMIN SERPL-MCNC: 4 G/DL (ref 3.8–4.8)
ERYTHROCYTE [DISTWIDTH] IN BLOOD BY AUTOMATED COUNT: 17.5 % (ref 11.7–15.4)
HCT VFR BLD AUTO: 37.1 % (ref 34–46.6)
HGB BLD-MCNC: 11.9 G/DL (ref 11.1–15.9)
IRON SERPL-MCNC: 50 UG/DL (ref 27–159)
MCH RBC QN AUTO: 26.4 PG (ref 26.6–33)
MCHC RBC AUTO-ENTMCNC: 32.1 G/DL (ref 31.5–35.7)
MCV RBC AUTO: 82 FL (ref 79–97)
PLATELET # BLD AUTO: 251 X10E3/UL (ref 150–450)
RBC # BLD AUTO: 4.51 X10E6/UL (ref 3.77–5.28)
WBC # BLD AUTO: 6 X10E3/UL (ref 3.4–10.8)

## 2021-11-04 ENCOUNTER — OFFICE VISIT (OUTPATIENT)
Dept: SURGERY | Age: 41
End: 2021-11-04
Payer: COMMERCIAL

## 2021-11-04 VITALS
OXYGEN SATURATION: 99 % | BODY MASS INDEX: 41.14 KG/M2 | HEART RATE: 80 BPM | TEMPERATURE: 97.1 F | WEIGHT: 256 LBS | SYSTOLIC BLOOD PRESSURE: 145 MMHG | DIASTOLIC BLOOD PRESSURE: 94 MMHG | HEIGHT: 66 IN

## 2021-11-04 DIAGNOSIS — K91.2 POST-RESECTION MALABSORPTION: Primary | ICD-10-CM

## 2021-11-04 DIAGNOSIS — E66.01 MORBID OBESITY (HCC): ICD-10-CM

## 2021-11-04 DIAGNOSIS — Z98.84 S/P GASTRIC BYPASS: ICD-10-CM

## 2021-11-04 PROCEDURE — 99212 OFFICE O/P EST SF 10 MIN: CPT | Performed by: NURSE PRACTITIONER

## 2021-11-04 NOTE — PATIENT INSTRUCTIONS
[facility] and [address], [address], [address]  [email or web address]    [facility] (referral needed)  [facility] Intern, [place]  [phone]   [email or web address]    [address] [facility]  [phone]  [email or web address]    [address] [facility]  [phone]  [email or web address]    [address], [address]  [email or web address] [place] [address] (these are the ones that specialize in bariatrics)  (Only Telehealth, does not work with insurance but are partnered with [employer] to file for out of network reimbursement)  Dr. Beckie Carrera  [facility]. [facility]

## 2021-11-04 NOTE — PROGRESS NOTES
Bariatric Postoperative Progress Note    CC: 3 Month LGBP Follow Up    Steven Jin is a [address] y.o. female now 3 months status post laparoscopic gastric bypass surgery performed on 21. Doing well overall. Currently eating carb master protein smoothie, eggs, fish, shrimp, protein shake spinach, asparagus, yogurt, cheese. Taking in 104 oz water,  40-50 g protein. 45 min of activity 7 days a week. Bowel movements are alternating constipation and regularity. The patient is not having any pain. She is compliant with multivitamins, calcium, Vit D and B12 supplements. Weight Loss Metrics 2021   Pre op / Initial Wt 317 - 317 - 317 - -   Today's Wt - 256 lb - 288 lb - 300 lb -   BMI - 41.32 kg/m2 - 46.48 kg/m2 - 48.42 kg/m2 48.24 kg/m2   Ideal Body Wt 134 - 134 - 134 - -   Excess Body Wt 183 - 183 - 183 - -   Goal Wt 171 - 171 - 171 - -   Wt loss to date 61 - 29 - 17 - -   % Wt Loss 0.42 - 0.2 - 0.12 - -   80% .4 - 146.4 - 146.4 - -     Comorbidities:  Hypertension: not applicable  Diabetes: not applicable  Obstructive Sleep Apnea: resolved  Hyperlipidemia: not applicable  Stress Urinary Incontinence: improved  Gastroesophageal Reflux: not applicable  Weight related arthropathy:improved        Past Medical History:   Diagnosis Date    Depression     Fibroids     IBS (irritable bowel syndrome)     Sleep apnea     uses cpap       Past Surgical History:   Procedure Laterality Date    HX BREAST REDUCTION Bilateral 2013    HX  SECTION  08/10/2002    HX LAP CHOLECYSTECTOMY  2002    HX LAP GASTRIC BYPASS  2021    HX TOTAL LAPAROSCOPIC HYSTERECTOMY  03/15/2021       Current Outpatient Medications   Medication Sig Dispense Refill    linaCLOtide (Linzess) 145 mcg cap capsule Take  by mouth Daily (before breakfast).  cyanocobalamin 1,000 mcg tablet Take 1,000 mcg by mouth daily.       thiamine HCL (Vitamin B-1) 100 mg tablet Take  by mouth daily.  cholecalciferol, vitamin D3, (Vitamin D3) 50 mcg (2,000 unit) tab Take  by mouth.  calcium-cholecalciferol, d3, (CALCIUM 600 + D) 600-125 mg-unit tab Take  by mouth.  multivitamin (ONE A DAY) tablet Take 1 Tablet by mouth daily.  venlafaxine HCl (VENLAFAXINE PO) 300 mg daily.  ARIPiprazole (ABILIFY) 15 mg tablet       traZODone (DESYREL) 100 mg tablet TAKE 2 TABLET BY MOUTH EVERY NIGHT AS NEEDED FOR SLEEP. No Known Allergies    ROS:  Review of Systems   Constitutional: Positive for weight loss. Negative for malaise/fatigue. Eyes: Negative. Respiratory: Negative. Cardiovascular: Negative. Gastrointestinal: Positive for constipation. Negative for abdominal pain, diarrhea, heartburn, nausea and vomiting. Genitourinary: Negative. Musculoskeletal: Negative. Skin: Negative. Neurological: Negative for dizziness, tingling, loss of consciousness, weakness and headaches. Physicial Exam:  Visit Vitals  BP (!) 145/94 (BP 1 Location: Right arm, BP Patient Position: Sitting)   Pulse 80   Temp 97.1 °F (36.2 °C)   Ht 5' 6\" (1.676 m)   Wt 116.1 kg (256 lb)   LMP 02/01/2021 (Exact Date)   SpO2 99%   BMI 41.32 kg/m²     Physical Exam  Vitals and nursing note reviewed. Constitutional:       Appearance: She is obese. HENT:      Head: Normocephalic and atraumatic. Cardiovascular:      Rate and Rhythm: Normal rate and regular rhythm. Pulses: Normal pulses. Heart sounds: Normal heart sounds. Pulmonary:      Effort: Pulmonary effort is normal.      Breath sounds: Normal breath sounds. Abdominal:      General: Bowel sounds are normal. There is no distension. Palpations: Abdomen is soft. There is no mass. Tenderness: There is no abdominal tenderness. Hernia: No hernia is present. Musculoskeletal:         General: Normal range of motion. Skin:     General: Skin is warm and dry.    Neurological:      General: No focal deficit present. Mental Status: She is alert and oriented to person, place, and time. Psychiatric:         Mood and Affect: Mood normal.         Behavior: Behavior normal.         Labs:   Recent Results (from the past 672 hour(s))   [facility] SPECIMEN COL    Collection Time: 10/27/21  8:24 AM   Result Value Ref Range    [facility]. Specimens collected/sent to [facility]. Please direct inquiries to ([phone]). CBC W/O DIFF    Collection Time: 10/27/21  8:28 AM   Result Value Ref Range    WBC 6.0 3.4 - 10.8 x10E3/uL    RBC 4.51 3.77 - 5.28 x10E6/uL    HGB 11.9 11.1 - 15.9 g/dL    HCT 37.1 34.0 - 46.6 %    MCV 82 79 - 97 fL    MCH 26.4 (L) 26.6 - 33.0 pg    MCHC 32.1 31.5 - 35.7 g/dL    RDW 17.5 (H) 11.7 - 15.4 %    PLATELET 362 133 - 411 x10E3/uL   ALBUMIN    Collection Time: 10/27/21  8:28 AM   Result Value Ref Range    Albumin 4.0 3.8 - 4.8 g/dL   IRON    Collection Time: 10/27/21  8:28 AM   Result Value Ref Range    Iron 50 27 - 159 ug/dL       Assessment/Plan:   She is currently 3 months s/p laparoscopic gastric bypass surgery with a total weight loss of 61 lbs to date, doing well. Labs were reviewed and pt was instructed to continue MVI/B1/B12/D supplementation. Provided list of therapists per pt's request as Dr. Alex Lynch is not taking on pts after surgery. We have reviewed the components of a successful postoperative course including requirement for a high protein, low carbohydrate diet, 64 oz a day of zero calorie liquids, daily vitamin supplementation, daily exercise (150 mis/week), regular follow-up, and participation in support groups. Refer to registered dietitian for more detailed medical nutrition therapy as needed. The primary encounter diagnosis was Post-resection malabsorption. Diagnoses of S/P gastric bypass, Morbid obesity ([address] and BMI 40.0-44.9, adult [facility]) were also pertinent to this visit.      Follow-up and Dispositions    · Return in about 3 months (around 2/4/2022), or if symptoms worsen or fail to improve. with labs, sooner as needed.   Chelsi Blair, NP

## 2021-11-05 ENCOUNTER — TELEPHONE (OUTPATIENT)
Dept: BARIATRICS/WEIGHT MGMT | Age: 41
End: 2021-11-05

## 2021-11-05 NOTE — TELEPHONE ENCOUNTER
Called patient to discuss adding some other sources of protein and answer any other questions concerning her post-op diet now at 3 months.

## 2021-11-08 LAB — VIT B1 BLD-SCNC: 208.4 NMOL/L (ref 66.5–200)

## 2022-01-27 ENCOUNTER — HOSPITAL ENCOUNTER (OUTPATIENT)
Dept: LAB | Age: 42
Discharge: HOME | End: 2022-01-27

## 2022-01-27 LAB — XX-LABCORP SPECIMEN COL,LCBCF: NORMAL

## 2022-01-27 PROCEDURE — 99001 SPECIMEN HANDLING PT-LAB: CPT

## 2022-01-29 LAB
25(OH)D3+25(OH)D2 SERPL-MCNC: 121 NG/ML (ref 30–100)
ALBUMIN SERPL-MCNC: 4.5 G/DL (ref 3.8–4.8)
ALBUMIN/GLOB SERPL: 1.1 {RATIO} (ref 1.2–2.2)
ALP SERPL-CCNC: 88 IU/L (ref 44–121)
ALT SERPL-CCNC: 17 IU/L (ref 0–32)
AST SERPL-CCNC: 25 IU/L (ref 0–40)
BILIRUB SERPL-MCNC: 0.4 MG/DL (ref 0–1.2)
BUN SERPL-MCNC: 12 MG/DL (ref 6–24)
BUN/CREAT SERPL: 13 (ref 9–23)
CALCIUM SERPL-MCNC: 9.9 MG/DL (ref 8.7–10.2)
CHLORIDE SERPL-SCNC: 102 MMOL/L (ref 96–106)
CO2 SERPL-SCNC: 23 MMOL/L (ref 20–29)
CREAT SERPL-MCNC: 0.96 MG/DL (ref 0.57–1)
ERYTHROCYTE [DISTWIDTH] IN BLOOD BY AUTOMATED COUNT: 14.4 % (ref 11.7–15.4)
FERRITIN SERPL-MCNC: 105 NG/ML (ref 15–150)
FOLATE SERPL-MCNC: 18.2 NG/ML
GLOBULIN SER CALC-MCNC: 4 G/DL (ref 1.5–4.5)
GLUCOSE SERPL-MCNC: 79 MG/DL (ref 65–99)
HCT VFR BLD AUTO: 41.4 % (ref 34–46.6)
HGB BLD-MCNC: 13.8 G/DL (ref 11.1–15.9)
IRON SERPL-MCNC: 63 UG/DL (ref 27–159)
MCH RBC QN AUTO: 26.8 PG (ref 26.6–33)
MCHC RBC AUTO-ENTMCNC: 33.3 G/DL (ref 31.5–35.7)
MCV RBC AUTO: 81 FL (ref 79–97)
PLATELET # BLD AUTO: 270 X10E3/UL (ref 150–450)
POTASSIUM SERPL-SCNC: 4.1 MMOL/L (ref 3.5–5.2)
PROT SERPL-MCNC: 8.5 G/DL (ref 6–8.5)
RBC # BLD AUTO: 5.14 X10E6/UL (ref 3.77–5.28)
SODIUM SERPL-SCNC: 140 MMOL/L (ref 134–144)
VIT B12 SERPL-MCNC: >2000 PG/ML (ref 232–1245)
WBC # BLD AUTO: 4.8 X10E3/UL (ref 3.4–10.8)

## 2022-02-03 ENCOUNTER — DOCUMENTATION ONLY (OUTPATIENT)
Dept: BARIATRICS/WEIGHT MGMT | Age: 42
End: 2022-02-03

## 2022-02-03 ENCOUNTER — OFFICE VISIT (OUTPATIENT)
Dept: SURGERY | Age: 42
End: 2022-02-03
Payer: COMMERCIAL

## 2022-02-03 ENCOUNTER — HOSPITAL ENCOUNTER (OUTPATIENT)
Dept: BARIATRICS/WEIGHT MGMT | Age: 42
Discharge: HOME | End: 2022-02-03

## 2022-02-03 VITALS
TEMPERATURE: 97.1 F | HEIGHT: 66 IN | OXYGEN SATURATION: 100 % | WEIGHT: 234 LBS | SYSTOLIC BLOOD PRESSURE: 136 MMHG | BODY MASS INDEX: 37.61 KG/M2 | DIASTOLIC BLOOD PRESSURE: 81 MMHG | HEART RATE: 74 BPM

## 2022-02-03 DIAGNOSIS — K91.2 POST-RESECTION MALABSORPTION: Primary | ICD-10-CM

## 2022-02-03 DIAGNOSIS — E66.9 OBESITY (BMI 30-39.9): ICD-10-CM

## 2022-02-03 DIAGNOSIS — Z98.84 S/P GASTRIC BYPASS: ICD-10-CM

## 2022-02-03 DIAGNOSIS — K91.2 POST-RESECTION MALABSORPTION: ICD-10-CM

## 2022-02-03 PROCEDURE — 99213 OFFICE O/P EST LOW 20 MIN: CPT | Performed by: NURSE PRACTITIONER

## 2022-02-03 NOTE — PROGRESS NOTES
Bariatric Postoperative Progress Note    CC: 6 Month LGBP Follow Up    Leonel Jameson is a 35 y.o. female now 6 months status post laparoscopic gastric bypass surgery performed on 21. Doing well overall. Currently eating protein shake, yogurt, fish, tuna, and sugar babies (candy) No vegetables or fruit. Taking in 104oz water,  90 g protein. 60 min of activity 7 days a week. Bowel movements are alternating constipation and regularity. The patient is not having any pain. She is compliant with multivitamins, calcium, Vit D and B12 supplements. Seeing therapist weekly    ETOH (tried once), denies tobacco or NSAID use. Weight Loss Metrics 2/3/2022 2/3/2022 2021 2021 2021 2021 2021   Pre op / Initial Wt 317 - 317 - 317 - 317   Today's Wt - 234 lb - 256 lb - 288 lb -   BMI - 37.77 kg/m2 - 41.32 kg/m2 - 46.48 kg/m2 -   Ideal Body Wt 134 - 134 - 134 - 134   Excess Body Wt 183 - 183 - 183 - 183   Goal Wt 171 - 171 - 171 - 171   Wt loss to date 83 - 61 - 29 - 17   % Wt Loss 0.57 - 0.42 - 0.2 - 0.12   80% .4 - 146.4 - 146.4 - 146.4     Comorbidities:  Hypertension: not applicable  Diabetes: not applicable  Obstructive Sleep Apnea: resolved  Hyperlipidemia: not applicable  Stress Urinary Incontinence: improved  Gastroesophageal Reflux: not applicable  Weight related arthropathy:improved        Past Medical History:   Diagnosis Date    Depression     Fibroids     IBS (irritable bowel syndrome)     Sleep apnea     uses cpap       Past Surgical History:   Procedure Laterality Date    HX BREAST REDUCTION Bilateral 2013    HX  SECTION  08/10/2002    HX LAP CHOLECYSTECTOMY  2002    HX LAP GASTRIC BYPASS  2021    HX TOTAL LAPAROSCOPIC HYSTERECTOMY  03/15/2021       Current Outpatient Medications   Medication Sig Dispense Refill    cyanocobalamin 1,000 mcg tablet Take 1,000 mcg by mouth daily.  thiamine HCL (Vitamin B-1) 100 mg tablet Take  by mouth daily.  cholecalciferol, vitamin D3, (Vitamin D3) 50 mcg (2,000 unit) tab Take  by mouth.  calcium-cholecalciferol, d3, (CALCIUM 600 + D) 600-125 mg-unit tab Take  by mouth.  multivitamin (ONE A DAY) tablet Take 1 Tablet by mouth daily.  venlafaxine HCl (VENLAFAXINE PO) 300 mg daily.  ARIPiprazole (ABILIFY) 15 mg tablet       traZODone (DESYREL) 100 mg tablet TAKE 2 TABLET BY MOUTH EVERY NIGHT AS NEEDED FOR SLEEP. No Known Allergies    ROS:  Review of Systems   Constitutional: Positive for weight loss. Negative for malaise/fatigue. Eyes: Negative. Respiratory: Negative. Cardiovascular: Negative for chest pain and palpitations. Gastrointestinal: Positive for constipation. Negative for abdominal pain, blood in stool, diarrhea, heartburn, melena, nausea and vomiting. Genitourinary: Negative. Skin: Negative. Neurological: Negative for dizziness, tingling, loss of consciousness, weakness and headaches. Physicial Exam:  Visit Vitals  /81 (BP 1 Location: Right arm, BP Patient Position: Sitting)   Pulse 74   Temp 97.1 °F (36.2 °C)   Ht 5' 6\" (1.676 m)   Wt 106.1 kg (234 lb)   LMP 02/01/2021 (Exact Date)   SpO2 100%   BMI 37.77 kg/m²     Physical Exam  Vitals and nursing note reviewed. Constitutional:       Appearance: She is obese. HENT:      Head: Normocephalic and atraumatic. Cardiovascular:      Rate and Rhythm: Normal rate. Pulses: Normal pulses. Heart sounds: Normal heart sounds. Pulmonary:      Effort: Pulmonary effort is normal.      Breath sounds: Normal breath sounds. Abdominal:      General: Bowel sounds are normal. There is no distension. Palpations: Abdomen is soft. There is no mass. Tenderness: There is no abdominal tenderness. Hernia: No hernia is present. Musculoskeletal:         General: Normal range of motion. Skin:     General: Skin is warm and dry. Neurological:      General: No focal deficit present. Mental Status: She is alert and oriented to person, place, and time. Psychiatric:         Mood and Affect: Mood normal.         Behavior: Behavior normal.         Labs:   Recent Results (from the past 672 hour(s))   CBC W/O DIFF    Collection Time: 01/27/22 12:00 AM   Result Value Ref Range    WBC 4.8 3.4 - 10.8 x10E3/uL    RBC 5.14 3.77 - 5.28 x10E6/uL    HGB 13.8 11.1 - 15.9 g/dL    HCT 41.4 34.0 - 46.6 %    MCV 81 79 - 97 fL    MCH 26.8 26.6 - 33.0 pg    MCHC 33.3 31.5 - 35.7 g/dL    RDW 14.4 11.7 - 15.4 %    PLATELET 848 290 - 163 [id number]/TK   METABOLIC PANEL, COMPREHENSIVE    Collection Time: 01/27/22 12:00 AM   Result Value Ref Range    Glucose 79 65 - 99 mg/dL    BUN 12 6 - 24 mg/dL    Creatinine 0.96 0.57 - 1.00 mg/dL    GFR est non-AA 73 >59 mL/min/1.73    GFR est AA 84 >59 mL/min/1.73    BUN/Creatinine ratio 13 9 - 23    Sodium 140 134 - 144 mmol/L    Potassium 4.1 3.5 - 5.2 mmol/L    Chloride 102 96 - 106 mmol/L    CO2 23 20 - 29 mmol/L    Calcium 9.9 8.7 - 10.2 mg/dL    Protein, total 8.5 6.0 - 8.5 g/dL    Albumin 4.5 3.8 - 4.8 g/dL    GLOBULIN, TOTAL 4.0 1.5 - 4.5 g/dL    A-G Ratio 1.1 (L) 1.2 - 2.2    Bilirubin, total 0.4 0.0 - 1.2 mg/dL    Alk. phosphatase 88 44 - 121 IU/L    AST (SGOT) 25 0 - 40 IU/L    ALT (SGPT) 17 0 - 32 IU/L   VITAMIN B12 & FOLATE    Collection Time: 01/27/22 12:00 AM   Result Value Ref Range    Vitamin B12 >2000 (H) 232 - 1245 pg/mL    Folate 18.2 >3.0 ng/mL   VITAMIN D, 25 HYDROXY    Collection Time: 01/27/22 12:00 AM   Result Value Ref Range    VITAMIN D, 25-HYDROXY 121.0 (H) 30.0 - 100.0 ng/mL   IRON    Collection Time: 01/27/22 12:00 AM   Result Value Ref Range    Iron 63 27 - 159 ug/dL   FERRITIN    Collection Time: 01/27/22 12:00 AM   Result Value Ref Range    Ferritin 105 15 - 150 ng/mL   [facility] SPECIMEN COL    Collection Time: 01/27/22  7:27 AM   Result Value Ref Range    [facility] SPEC[facility] COLL[facility].         Specimens collected/sent to [facility]. Please direct inquiries to ([phone]). Assessment/Plan:   She is currently 6 months s/p laparoscopic gastric bypass surgery with a total weight loss of 83 lbs to date, doing well. Labs were reviewed and pt was instructed to continue MVI/B1/B12/D supplementation. Can decrease B12 to half tab daily or every other day. Decrease vitamin D to 3 days weekly. Incorporate vegetables and stop candy. Long discussion regarding high density carbohydrates and their impacts to weight regain, hunger, cravings and reactive hypoglycemia. She has an appointment with RD scheduled later today. Constipation protocol reviewed. We have reviewed the components of a successful postoperative course including requirement for a high protein, low carbohydrate diet, 64 oz a day of zero calorie liquids, daily vitamin supplementation, daily exercise (150 mis/week), regular follow-up, and participation in support groups. Refer to registered dietitian for more detailed medical nutrition therapy as needed. The primary encounter diagnosis was Post-resection malabsorption. Diagnoses of S/P gastric bypass, Obesity (BMI 30-39.9), and BMI 37.0-37.9, adult were also pertinent to this visit. with labs, sooner as needed.   Yesenia Vieyra NP

## 2022-02-03 NOTE — PATIENT INSTRUCTIONS
Can decrease B12 to every other day  Decrease vitamin D to 2-3 times weekly      Constipation    It typically can be prevented by drinking at least 64 ounces of water daily    If you're prone to constipation:  - Take a Stool Softener every day:  (such as Dulcolax Stool Softener)  - Eat Plenty of Fiber   Keep your fiber intake to at least 20 grams a day   Use SUGAR-FREE products: Fiber One products, Benefiber or Metamucil    If you get constipated:  1. Start with a Stool Softener such as Ducloax (bisacodyl)    Dulcolax Stool Softener 100 mg    2. Or try an osmotic laxative such as Miralax   1 capful daily, can go up to 3 capfuls daily    3. If you still have constipation after 2 or 3 days, add a Stimulant Laxative to the Stool Softener (this will produce a bowel movement in 6 - 12 hr):   Examples:    Exlax (1 small square) for up to 4 days    Dulcolax stimulant laxative    Magnesium citrate pill 500 mg start with once a day and go up to 3 times a day if needed    3. Or you can go straight to a combination Stool Softner / Stimulant at night. If you need, you can add a morning dose. Examples:    Pericolace 50 mg / 8.25 mg    Sennakot-S  50 mg / 8.25 mg    4.   If You're Really locked up, get Liquid Magnesium Citrate (take according to the  directions)

## 2022-02-07 LAB
SPECIMEN STATUS REPORT, ROLRST: NORMAL
VIT B1 BLD-SCNC: 169.7 NMOL/L (ref 66.5–200)

## 2022-03-18 PROBLEM — E66.01 MORBID OBESITY WITH BMI OF 45.0-49.9, ADULT (HCC): Status: ACTIVE | Noted: 2021-08-04

## 2022-07-26 ENCOUNTER — HOSPITAL ENCOUNTER (OUTPATIENT)
Dept: LAB | Age: 42
Discharge: HOME | End: 2022-07-26

## 2022-07-26 LAB — XX-LABCORP SPECIMEN COL,LCBCF: NORMAL

## 2022-07-26 PROCEDURE — 99001 SPECIMEN HANDLING PT-LAB: CPT

## 2022-08-01 LAB
25(OH)D3+25(OH)D2 SERPL-MCNC: 93.5 NG/ML (ref 30–100)
ALBUMIN SERPL-MCNC: 3.8 G/DL (ref 3.8–4.8)
ALBUMIN/GLOB SERPL: 1 {RATIO} (ref 1.2–2.2)
ALP SERPL-CCNC: 71 IU/L (ref 44–121)
ALT SERPL-CCNC: 13 IU/L (ref 0–32)
AST SERPL-CCNC: 17 IU/L (ref 0–40)
BILIRUB SERPL-MCNC: 0.6 MG/DL (ref 0–1.2)
BUN SERPL-MCNC: 8 MG/DL (ref 6–24)
BUN/CREAT SERPL: 12 (ref 9–23)
CALCIUM SERPL-MCNC: 9.3 MG/DL (ref 8.7–10.2)
CHLORIDE SERPL-SCNC: 100 MMOL/L (ref 96–106)
CO2 SERPL-SCNC: 26 MMOL/L (ref 20–29)
CREAT SERPL-MCNC: 0.66 MG/DL (ref 0.57–1)
EGFR: 112 ML/MIN/1.73
FERRITIN SERPL-MCNC: 163 NG/ML (ref 15–150)
FOLATE SERPL-MCNC: >20 NG/ML
GLOBULIN SER CALC-MCNC: 3.7 G/DL (ref 1.5–4.5)
GLUCOSE SERPL-MCNC: 72 MG/DL (ref 65–99)
IRON SERPL-MCNC: 93 UG/DL (ref 27–159)
POTASSIUM SERPL-SCNC: 3.8 MMOL/L (ref 3.5–5.2)
PROT SERPL-MCNC: 7.5 G/DL (ref 6–8.5)
SODIUM SERPL-SCNC: 141 MMOL/L (ref 134–144)
VIT B1 BLD-SCNC: 215.1 NMOL/L (ref 66.5–200)
VIT B12 SERPL-MCNC: 1908 PG/ML (ref 232–1245)

## 2022-08-04 ENCOUNTER — OFFICE VISIT (OUTPATIENT)
Dept: SURGERY | Age: 42
End: 2022-08-04
Payer: COMMERCIAL

## 2022-08-04 VITALS
TEMPERATURE: 97 F | HEIGHT: 66 IN | SYSTOLIC BLOOD PRESSURE: 146 MMHG | RESPIRATION RATE: 16 BRPM | BODY MASS INDEX: 34.23 KG/M2 | DIASTOLIC BLOOD PRESSURE: 95 MMHG | WEIGHT: 213 LBS | HEART RATE: 70 BPM

## 2022-08-04 DIAGNOSIS — K59.09 CHRONIC CONSTIPATION: ICD-10-CM

## 2022-08-04 DIAGNOSIS — E66.9 OBESITY (BMI 30-39.9): ICD-10-CM

## 2022-08-04 DIAGNOSIS — Z98.84 S/P GASTRIC BYPASS: ICD-10-CM

## 2022-08-04 DIAGNOSIS — K91.2 POST-RESECTION MALABSORPTION: Primary | ICD-10-CM

## 2022-08-04 PROCEDURE — 99213 OFFICE O/P EST LOW 20 MIN: CPT | Performed by: NURSE PRACTITIONER

## 2022-08-04 NOTE — PROGRESS NOTES
Bariatric Postoperative Progress Note    Conchis Vera is a 35 y.o. female now {CARD CP SECERITY:79660} {days/wks/mos/yrs:935907} status post {BARIATRIC SURGERY PLYZS:99460} performed on ***. Doing well overall. Currently eating ***. Taking in ***oz water,  ***g protein. ***min of activity {NUMBERS 1 - 7:75703} days a week. Bowel movements are {constipation:68805::\"regular\"}. She {IS/IS NOT:15197} compliant with multivitamins, calcium, Vit D and B12 supplements. Weight Loss Metrics 2022 2/3/2022 2/3/2022 2021 2021 2021 2021   Pre op / Initial Wt - 317 - 317 - 317 -   Today's Wt 213 lb - 234 lb - 256 lb - 288 lb   BMI 34.38 kg/m2 - 37.77 kg/m2 - 41.32 kg/m2 - 46.48 kg/m2   Ideal Body Wt - 134 - 134 - 134 -   Excess Body Wt - 183 - 183 - 183 -   Goal Wt - 171 - 171 - 171 -   Wt loss to date - 83 - 61 - 29 -   % Wt Loss - 0.57 - 0.42 - 0.2 -   80% EBW - 146.4 - 146.4 - 146.4 -       Comorbidities:  Hypertension: {sx desc:40754}  Diabetes: {sx desc:37710}  Obstructive Sleep Apnea: {sx desc:59213}  Hyperlipidemia: {sx desc:94949}  Stress Urinary Incontinence: {sx desc:39865}  Gastroesophageal Reflux: {sx desc:75190}  Weight related arthropathy:{sx desc:05444}      Past Medical History:   Diagnosis Date    Depression     Fibroids     IBS (irritable bowel syndrome)     Sleep apnea     uses cpap       Past Surgical History:   Procedure Laterality Date    HX BREAST REDUCTION Bilateral 2013    HX  SECTION  08/10/2002    HX LAP CHOLECYSTECTOMY  2002    HX LAP GASTRIC BYPASS  2021    HX TOTAL LAPAROSCOPIC HYSTERECTOMY  03/15/2021       Current Outpatient Medications   Medication Sig Dispense Refill    cyanocobalamin 1,000 mcg tablet Take 1,000 mcg by mouth daily. thiamine HCL (B-1) 100 mg tablet Take  by mouth daily.       cholecalciferol, vitamin D3, 50 mcg (2,000 unit) tab Take  by mouth.      calcium-cholecalciferol, d3, (CALCIUM 600 + D) 600-125 mg-unit tab Take  by mouth.      multivitamin (ONE A DAY) tablet Take 1 Tablet by mouth daily. venlafaxine HCl (VENLAFAXINE PO) 300 mg daily. ARIPiprazole (ABILIFY) 15 mg tablet       traZODone (DESYREL) 100 mg tablet TAKE 2 TABLET BY MOUTH EVERY NIGHT AS NEEDED FOR SLEEP. No Known Allergies    ROS:  ROS      Physicial Exam:  Visit Vitals  BP (!) 146/95   Pulse 70   Temp 97 °F (36.1 °C)   Resp 16   Ht 5' 6\" (1.676 m)   Wt 96.6 kg (213 lb)   LMP 02/01/2021 (Exact Date)   BMI 34.38 kg/m²     Physical Exam    Labs:   Recent Results (from the past 672 hour(s))   [facility] SPECIMEN COL    Collection Time: 07/26/22  7:16 AM   Result Value Ref Range    [facility] SPE[facility]. Specimens collected/sent to [facility]. Please direct inquiries to ([phone]). FERRITIN    Collection Time: 07/26/22  8:25 AM   Result Value Ref Range    Ferritin 163 (H) 15 - 150 ng/mL   IRON    Collection Time: 07/26/22  8:25 AM   Result Value Ref Range    Iron 93 27 - 725 ug/dL   METABOLIC PANEL, COMPREHENSIVE    Collection Time: 07/26/22  8:25 AM   Result Value Ref Range    Glucose 72 65 - 99 mg/dL    BUN 8 6 - 24 mg/dL    Creatinine 0.66 0.57 - 1.00 mg/dL    eGFR 112 >59 mL/min/1.73    BUN/Creatinine ratio 12 9 - 23    Sodium 141 134 - 144 mmol/L    Potassium 3.8 3.5 - 5.2 mmol/L    Chloride 100 96 - 106 mmol/L    CO2 26 20 - 29 mmol/L    Calcium 9.3 8.7 - 10.2 mg/dL    Protein, total 7.5 6.0 - 8.5 g/dL    Albumin 3.8 3.8 - 4.8 g/dL    GLOBULIN, TOTAL 3.7 1.5 - 4.5 g/dL    A-G Ratio 1.0 (L) 1.2 - 2.2    Bilirubin, total 0.6 0.0 - 1.2 mg/dL    Alk.  phosphatase 71 44 - 121 IU/L    AST (SGOT) 17 0 - 40 IU/L    ALT (SGPT) 13 0 - 32 IU/L   VITAMIN B1, WHOLE BLOOD    Collection Time: 07/26/22  8:25 AM   Result Value Ref Range    Vitamin B1 215.1 (H) 66.5 - 200.0 nmol/L   VITAMIN B12 & FOLATE    Collection Time: 07/26/22  8:25 AM   Result Value Ref Range    Vitamin B12 1,908 (H) 232 - 1,245 pg/mL    Folate >20.0 >3.0 ng/mL   VITAMIN D, 25 HYDROXY Collection Time: 07/26/22  8:25 AM   Result Value Ref Range    VITAMIN D, 25-HYDROXY 93.5 30.0 - 100.0 ng/mL       Assessment/Plan:   He/She is currently {Time; 1 week to 1 year:17517} s/p {BARIATRIC SURGERY EAQ:63099} with a total weight loss of ***lbs to date, doing ***  Labs were*** and pt was instructed to ***    We have reviewed the components of a successful postoperative course including requirement for a high protein, low carbohydrate diet, 64 oz a day of zero calorie liquids, daily vitamin supplementation, daily exercise (150 mis/week), regular follow-up, and participation in support groups. Refer to registered dietitian for more detailed medical nutrition therapy as needed. There were no encounter diagnoses. with labs, sooner as needed.   Romi Vazquez NP

## 2022-08-04 NOTE — PROGRESS NOTES
Bariatric Postoperative Progress Note    CC: 12 Month LGBP Follow Up    Carmen Hampton is a 35 y.o. female now 1 year status post laparoscopic gastric bypass surgery performed on 21. Doing well overall. Currently eating yogurt, eggs, tuna, liver, vegetables, fruits, cheese and protein shakes. Taking in 100oz water,  80-100g protein. 90min of activity 5 days a week. Bowel movements are constipated. The patient is not having any pain. She is compliant with multivitamins, calcium, Vit D and B12 supplements. Got off track after last visit with a weight loss stall and was emotional eating. Is working with a therapist on coping mechanisms for emotional eating.    ETOH (rarely), NSAIDS (ibuprofen prn for migraines), denies tobacco.    Weight Loss Metrics 2022 2022 2/3/2022 2/3/2022 2021 2021 2021   Pre op / Initial Wt 317 - 317 - 317 - 317   Today's Wt - 213 lb - 234 lb - 256 lb -   BMI - 34.38 kg/m2 - 37.77 kg/m2 - 41.32 kg/m2 -   Ideal Body Wt 134 - 134 - 134 - 134   Excess Body Wt 183 - 183 - 183 - 183   Goal Wt 171 - 171 - 171 - 171   Wt loss to date 104 - 83 - 61 - 29   % Wt Loss 0.71 - 0.57 - 0.42 - 0.2   80% .4 - 146.4 - 146.4 - 146.4     Comorbidities:  Hypertension: not applicable  Diabetes: not applicable  Obstructive Sleep Apnea: resolved  Hyperlipidemia: not applicable  Stress Urinary Incontinence: improved  Gastroesophageal Reflux: not applicable  Weight related arthropathy:improved        Past Medical History:   Diagnosis Date    Depression     Fibroids     IBS (irritable bowel syndrome)     Sleep apnea     uses cpap       Past Surgical History:   Procedure Laterality Date    HX BREAST REDUCTION Bilateral 2013    HX  SECTION  08/10/2002    HX LAP CHOLECYSTECTOMY  2002    HX LAP GASTRIC BYPASS  2021    HX TOTAL LAPAROSCOPIC HYSTERECTOMY  03/15/2021       Current Outpatient Medications   Medication Sig Dispense Refill    cyanocobalamin 1,000 mcg tablet Take 1,000 mcg by mouth daily. thiamine HCL (B-1) 100 mg tablet Take  by mouth daily. cholecalciferol, vitamin D3, 50 mcg (2,000 unit) tab Take  by mouth.      calcium-cholecalciferol, d3, (CALCIUM 600 + D) 600-125 mg-unit tab Take  by mouth.      multivitamin (ONE A DAY) tablet Take 1 Tablet by mouth daily. venlafaxine HCl (VENLAFAXINE PO) 300 mg daily. ARIPiprazole (ABILIFY) 15 mg tablet       traZODone (DESYREL) 100 mg tablet TAKE 2 TABLET BY MOUTH EVERY NIGHT AS NEEDED FOR SLEEP. No Known Allergies    ROS:  Review of Systems   Constitutional:  Positive for malaise/fatigue and weight loss. Respiratory: Negative. Cardiovascular: Negative. Gastrointestinal:  Positive for abdominal pain, blood in stool (hemorrhoids) and constipation. Negative for diarrhea, heartburn, nausea and vomiting. Skin:  Negative for itching and rash. Neurological:  Negative for dizziness, loss of consciousness, weakness and headaches. Physicial Exam:  Visit Vitals  BP (!) 146/95   Pulse 70   Temp 97 °F (36.1 °C)   Resp 16   Ht 5' 6\" (1.676 m)   Wt 96.6 kg (213 lb)   LMP 02/01/2021 (Exact Date)   BMI 34.38 kg/m²     Physical Exam  Vitals and nursing note reviewed. Constitutional:       Appearance: She is obese. HENT:      Head: Normocephalic and atraumatic. Cardiovascular:      Rate and Rhythm: Normal rate. Heart sounds: Normal heart sounds. Pulmonary:      Effort: Pulmonary effort is normal.      Breath sounds: Normal breath sounds. Abdominal:      General: Bowel sounds are normal. There is no distension. Palpations: Abdomen is soft. There is no mass. Tenderness: There is no abdominal tenderness. Hernia: No hernia is present. Musculoskeletal:         General: Normal range of motion. Skin:     General: Skin is warm and dry. Neurological:      General: No focal deficit present. Mental Status: She is alert and oriented to person, place, and time. Psychiatric:         Mood and Affect: Mood normal.         Behavior: Behavior normal.       Labs:   Recent Results (from the past 672 hour(s))   [facility] SPECIMEN COL    Collection Time: 07/26/22  7:16 AM   Result Value Ref Range    [facility] SPEC[facility]. Specimens collected/sent to [facility]. Please direct inquiries to ([phone]). FERRITIN    Collection Time: 07/26/22  8:25 AM   Result Value Ref Range    Ferritin 163 (H) 15 - 150 ng/mL   IRON    Collection Time: 07/26/22  8:25 AM   Result Value Ref Range    Iron 93 27 - 222 ug/dL   METABOLIC PANEL, COMPREHENSIVE    Collection Time: 07/26/22  8:25 AM   Result Value Ref Range    Glucose 72 65 - 99 mg/dL    BUN 8 6 - 24 mg/dL    Creatinine 0.66 0.57 - 1.00 mg/dL    eGFR 112 >59 mL/min/1.73    BUN/Creatinine ratio 12 9 - 23    Sodium 141 134 - 144 mmol/L    Potassium 3.8 3.5 - 5.2 mmol/L    Chloride 100 96 - 106 mmol/L    CO2 26 20 - 29 mmol/L    Calcium 9.3 8.7 - 10.2 mg/dL    Protein, total 7.5 6.0 - 8.5 g/dL    Albumin 3.8 3.8 - 4.8 g/dL    GLOBULIN, TOTAL 3.7 1.5 - 4.5 g/dL    A-G Ratio 1.0 (L) 1.2 - 2.2    Bilirubin, total 0.6 0.0 - 1.2 mg/dL    Alk. phosphatase 71 44 - 121 IU/L    AST (SGOT) 17 0 - 40 IU/L    ALT (SGPT) 13 0 - 32 IU/L   VITAMIN B1, WHOLE BLOOD    Collection Time: 07/26/22  8:25 AM   Result Value Ref Range    Vitamin B1 215.1 (H) 66.5 - 200.0 nmol/L   VITAMIN B12 & FOLATE    Collection Time: 07/26/22  8:25 AM   Result Value Ref Range    Vitamin B12 1,908 (H) 232 - 1,245 pg/mL    Folate >20.0 >3.0 ng/mL   VITAMIN D, 25 HYDROXY    Collection Time: 07/26/22  8:25 AM   Result Value Ref Range    VITAMIN D, 25-HYDROXY 93.5 30.0 - 100.0 ng/mL       Assessment/Plan:   She is currently 1 year s/p laparoscopic gastric bypass surgery with a total weight loss of 104 lbs to date, doing well. Labs were reviewed and pt was instructed to continue MVI/B1/B12/D supplementation. Currently taking B12 and D every other day.  Can also take B1 every other day. Incorporate Miralax and stool softener daily to assist with constipation. She has colonoscopy scheduled next month. We have reviewed the components of a successful postoperative course including requirement for a high protein, low carbohydrate diet, 64 oz a day of zero calorie liquids, daily vitamin supplementation, daily exercise (150 mis/week), regular follow-up, and participation in support groups. Refer to registered dietitian for more detailed medical nutrition therapy as needed. The primary encounter diagnosis was Post-resection malabsorption. Diagnoses of S/P gastric bypass, Obesity (BMI 30-39.9), BMI 34.0-34.9,adult, and Chronic constipation were also pertinent to this visit. Follow-up and Dispositions    Return in about 1 year (around 8/4/2023). with labs, sooner as needed.   Terrence Betancourt NP

## 2022-08-07 NOTE — TELEPHONE ENCOUNTER
Encouraged patient to increase protein intake and discussed possible options.       ----- Message from Hector Hammond DO sent at 3/26/2021 10:45 AM EDT -----  Needs protein supplementation
Notify Md.
Notify Md.
Notify Md. adjust heparin nomogram
MD notified. adjust provider to RN Ok to hold heparin until Ptt is obtained
Notify Md.
Notify Md. adjust heparin rate per nomogram
MD notified. Provider perform Arterial stick to get bl0d sample

## 2022-10-19 ENCOUNTER — OFFICE VISIT (OUTPATIENT)
Dept: SURGERY | Age: 42
End: 2022-10-19
Payer: COMMERCIAL

## 2022-10-19 VITALS
WEIGHT: 220 LBS | RESPIRATION RATE: 16 BRPM | DIASTOLIC BLOOD PRESSURE: 98 MMHG | OXYGEN SATURATION: 100 % | SYSTOLIC BLOOD PRESSURE: 144 MMHG | TEMPERATURE: 97.3 F | HEIGHT: 66 IN | HEART RATE: 94 BPM | BODY MASS INDEX: 35.36 KG/M2

## 2022-10-19 DIAGNOSIS — R63.5 WEIGHT GAIN STATUS POST GASTRIC BYPASS: ICD-10-CM

## 2022-10-19 DIAGNOSIS — Z98.84 S/P GASTRIC BYPASS: ICD-10-CM

## 2022-10-19 DIAGNOSIS — E66.9 OBESITY (BMI 30-39.9): ICD-10-CM

## 2022-10-19 DIAGNOSIS — Z98.84 WEIGHT GAIN STATUS POST GASTRIC BYPASS: ICD-10-CM

## 2022-10-19 DIAGNOSIS — K91.2 POST-RESECTION MALABSORPTION: Primary | ICD-10-CM

## 2022-10-19 DIAGNOSIS — Z72.4 INAPPROPRIATE DIET AND EATING HABITS: ICD-10-CM

## 2022-10-19 DIAGNOSIS — Z71.3 ENCOUNTER FOR WEIGHT LOSS COUNSELING: ICD-10-CM

## 2022-10-19 PROCEDURE — 99213 OFFICE O/P EST LOW 20 MIN: CPT | Performed by: REGISTERED NURSE

## 2022-10-19 RX ORDER — LINACLOTIDE 145 UG/1
CAPSULE, GELATIN COATED ORAL
COMMUNITY
Start: 2022-07-22

## 2022-10-19 NOTE — PROGRESS NOTES
Bariatric Postoperative Progress Note    Chief Complaint   Patient presents with    Follow-up     Back on Track, LGBP 21       Fanny Chester is a 35 y.o. female now 1 year 2 months status post laparoscopic gastric bypass surgery performed on 21. Pt admits to emotional eating. States she is not getting full after meals. Taking in 60+ oz water, unknown g protein. 45-60 min of activity 5 days a week. Bowel movements are regular. She is not having any pain. She is compliant with multivitamins, calcium, Vit D and B12 supplements. Pt states her weight gain is causing anxiety and stress. She is in therapy. Denies SI/HI. Denies ETOH, NSAID, and tobacco use. Expressed interest in weight loss medications. Noted elevated BP. Pt denies chest pain, shortness of breath, and dizziness.      Diet recall:   B- Eggs, protein oatmeal  L: *Fast food  D: *Fast food  *Burgers, fries, fried chicken, ice cream    Weight Loss Metrics 10/19/2022 10/19/2022 2022 2022 2/3/2022 2/3/2022 2021   Pre op / Initial Wt 317 - 317 - 317 - 317   Today's Wt - 220 lb - 213 lb - 234 lb -   BMI - 35.51 kg/m2 - 34.38 kg/m2 - 37.77 kg/m2 -   Ideal Body Wt 134 - 134 - 134 - 134   Excess Body Wt 183 - 183 - 183 - 183   Goal Wt 171 - 171 - 171 - 171   Wt loss to date 97 - 104 - 83 - 61   % Wt Loss 0.66 - 0.71 - 0.57 - 0.42   80% .4 - 146.4 - 146.4 - 146.4     Comorbidities:  Hypertension: not applicable  Diabetes: not applicable  Obstructive Sleep Apnea: resolved  Hyperlipidemia: not applicable  Stress Urinary Incontinence: improved  Gastroesophageal Reflux: not applicable  Weight related arthropathy:improved    Past Medical History:   Diagnosis Date    Depression     Fibroids     IBS (irritable bowel syndrome)     Sleep apnea     uses cpap       Past Surgical History:   Procedure Laterality Date    HX BREAST REDUCTION Bilateral 2013    HX  SECTION  08/10/2002    HX COLONOSCOPY  2022    Internal Hemroids    HX LAP CHOLECYSTECTOMY  09/2002    HX LAP GASTRIC BYPASS  08/04/2021    HX TOTAL LAPAROSCOPIC HYSTERECTOMY  03/15/2021       Current Outpatient Medications   Medication Sig Dispense Refill    Linzess 145 mcg cap capsule TAKE 1 CAPSULE BY MOUTH ONCE A DAY BEFORE MEALS      cyanocobalamin 1,000 mcg tablet Take 1,000 mcg by mouth daily. thiamine HCL (B-1) 100 mg tablet Take  by mouth daily. cholecalciferol, vitamin D3, 50 mcg (2,000 unit) tab Take  by mouth.      calcium-cholecalciferol, d3, (CALCIUM 600 + D) 600-125 mg-unit tab Take  by mouth.      multivitamin (ONE A DAY) tablet Take 1 Tablet by mouth daily. venlafaxine HCl (VENLAFAXINE PO) 300 mg daily. ARIPiprazole (ABILIFY) 15 mg tablet       traZODone (DESYREL) 100 mg tablet TAKE 2 TABLET BY MOUTH EVERY NIGHT AS NEEDED FOR SLEEP. No Known Allergies    ROS:  Review of Systems   Constitutional:  Positive for malaise/fatigue. Negative for weight loss. Eyes:  Negative for blurred vision. Respiratory:  Negative for cough and shortness of breath. Cardiovascular:  Negative for chest pain and palpitations. Gastrointestinal:  Negative for abdominal pain, nausea and vomiting. Musculoskeletal:  Negative for back pain. Neurological:  Negative for dizziness, tingling and headaches. Psychiatric/Behavioral:  Negative for suicidal ideas. The patient is nervous/anxious. Overwhelmed by life stressors. Seeing a therapist.        Physicial Exam:  Visit Vitals  BP (!) 144/98 (BP 1 Location: Left upper arm)   Pulse 94   Temp 97.3 °F (36.3 °C)   Resp 16   Ht 5' 6\" (1.676 m)   Wt 99.8 kg (220 lb)   LMP 02/01/2021 (Exact Date)   SpO2 100%   BMI 35.51 kg/m²     Physical Exam  Constitutional:       Appearance: She is obese. HENT:      Head: Normocephalic and atraumatic. Eyes:      Pupils: Pupils are equal, round, and reactive to light. Cardiovascular:      Rate and Rhythm: Normal rate and regular rhythm.    Pulmonary:      Effort: Pulmonary effort is normal.      Breath sounds: Normal breath sounds. Neurological:      Mental Status: She is alert and oriented to person, place, and time. Psychiatric:         Mood and Affect: Mood normal.         Behavior: Behavior normal.         Thought Content: Thought content normal.       Labs:   No results found for this or any previous visit (from the past 672 hour(s)). Assessment/Plan:   She is currently 1 year 2 months s/p laparoscopic gastric bypass surgery with a total weight loss of 97 lbs to date, experiencing weight gain. Lab work done on 7/26/2022 was reviewed. Pt was instructed to continue multivitamin/B1/B12/calcium/vit D supplementation. Extensive discussion regarding high density carbohydrates/ \"slider foods\" and their impacts on weight regain, hunger, cravings, and reactive hypoglycemia. Reviewed Back on Track packet. Pt agreeable to 7 day liquid diet to help curb sweet cravings. Reviewed the indications and side effects of Contrave, Qsymia, Orlistat, Saxenda, Ozempic, and phentermine. Pt most interested in Contrave. Follow up in one month to assess weight loss efforts. May consider medication at that time. Elevated BP in clinic today. Pt asymptomatic and attributes it to stress/anxiety. Follow up with PCP for BP evaluation. Continue therapy follow ups. We have reviewed the components of a successful postoperative course including requirement for a high protein, low carbohydrate diet, 64 oz a day of zero calorie liquids, daily vitamin supplementation, daily exercise (150 mins/week), regular follow-up, and participation in support groups. Refer to registered dietitian for more detailed medical nutrition therapy as needed. The primary encounter diagnosis was Post-resection malabsorption.  Diagnoses of S/P gastric bypass, Obesity (BMI 30-39.9), Weight gain status post gastric bypass, Inappropriate diet and eating habits, and Encounter for weight loss counseling were also pertinent to this visit. Follow-up and Dispositions    Return in about 4 weeks (around 11/16/2022) for weight check . Sooner as needed.   Divine Gonsalves NP

## 2022-11-23 ENCOUNTER — HOSPITAL ENCOUNTER (OUTPATIENT)
Dept: LAB | Age: 42
Discharge: HOME | End: 2022-11-23
Payer: COMMERCIAL

## 2022-11-23 ENCOUNTER — TELEPHONE (OUTPATIENT)
Dept: SURGERY | Age: 42
End: 2022-11-23

## 2022-11-23 ENCOUNTER — OFFICE VISIT (OUTPATIENT)
Dept: SURGERY | Age: 42
End: 2022-11-23
Payer: COMMERCIAL

## 2022-11-23 VITALS
HEART RATE: 80 BPM | HEIGHT: 66 IN | WEIGHT: 229 LBS | RESPIRATION RATE: 16 BRPM | TEMPERATURE: 98 F | DIASTOLIC BLOOD PRESSURE: 98 MMHG | OXYGEN SATURATION: 100 % | SYSTOLIC BLOOD PRESSURE: 160 MMHG | BODY MASS INDEX: 36.8 KG/M2

## 2022-11-23 DIAGNOSIS — E66.9 OBESITY (BMI 30-39.9): ICD-10-CM

## 2022-11-23 DIAGNOSIS — Z98.84 S/P GASTRIC BYPASS: ICD-10-CM

## 2022-11-23 DIAGNOSIS — K91.2 POST-RESECTION MALABSORPTION: ICD-10-CM

## 2022-11-23 DIAGNOSIS — R03.0 ELEVATED BLOOD PRESSURE READING WITHOUT DIAGNOSIS OF HYPERTENSION: ICD-10-CM

## 2022-11-23 DIAGNOSIS — Z71.3 ENCOUNTER FOR WEIGHT LOSS COUNSELING: Primary | ICD-10-CM

## 2022-11-23 DIAGNOSIS — Z71.3 ENCOUNTER FOR WEIGHT LOSS COUNSELING: ICD-10-CM

## 2022-11-23 LAB
ATRIAL RATE: 76 BPM
CALCULATED P AXIS, ECG09: 72 DEGREES
CALCULATED R AXIS, ECG10: 53 DEGREES
CALCULATED T AXIS, ECG11: 56 DEGREES
DIAGNOSIS, 93000: NORMAL
P-R INTERVAL, ECG05: 154 MS
Q-T INTERVAL, ECG07: 378 MS
QRS DURATION, ECG06: 78 MS
QTC CALCULATION (BEZET), ECG08: 425 MS
VENTRICULAR RATE, ECG03: 76 BPM

## 2022-11-23 PROCEDURE — 93005 ELECTROCARDIOGRAM TRACING: CPT

## 2022-11-23 PROCEDURE — 99214 OFFICE O/P EST MOD 30 MIN: CPT | Performed by: REGISTERED NURSE

## 2022-11-23 RX ORDER — PHENTERMINE HYDROCHLORIDE 37.5 MG/1
37.5 TABLET ORAL
Qty: 30 TABLET | Refills: 0 | Status: SHIPPED | OUTPATIENT
Start: 2022-11-23

## 2022-11-23 NOTE — PROGRESS NOTES
Bariatric Postoperative Progress Note    Chief Complaint   Patient presents with    Follow-up     Weight check, LGBP 2021     Linda Escudero is a 35 y.o. female now 1 year and 3 months status post laparoscopic gastric bypass surgery performed on 21. Follow up from 10/19/2022 visit. +9 lbs. Still eating sugary and fast foods. Admits to boredom eating. Taking in unknown oz water, unknown g protein. 30-45 min of activity 5 days a week. Bowel movements are regular. She is not having any pain. She is compliant with multivitamins, calcium, Vit D, B1, and B12 supplements. Patient states she has been emotional eating because of anxiety and stress. Reports feeling hungry all the time. Recently got out of a relationship. In therapy. Expressed interest in AOMs. Denies cardiac hx. Noted elevated BP. Pt states she takes OTC energy pills due to work schedule.      Weight Loss Metrics 2022 2022 10/19/2022 10/19/2022 2022 2022 2/3/2022   Pre op / Initial Wt 317 - 317 - 317 - 317   Today's Wt - 229 lb - 220 lb - 213 lb -   BMI - 36.96 kg/m2 - 35.51 kg/m2 - 34.38 kg/m2 -   Ideal Body Wt 134 - 134 - 134 - 134   Excess Body Wt 183 - 183 - 183 - 183   Goal Wt 171 - 171 - 171 - 171   Wt loss to date 88 - 97 - 104 - 83   % Wt Loss 0.6 - 0.66 - 0.71 - 0.57   80% .4 - 146.4 - 146.4 - 146.4       Comorbidities:  Hypertension: not applicable, pt reports taking energy pills   Diabetes: not applicable  Obstructive Sleep Apnea: resolved  Hyperlipidemia: not applicable  Stress Urinary Incontinence: improved  Gastroesophageal Reflux: not applicable  Weight related arthropathy:improved        Past Medical History:   Diagnosis Date    Depression     Fibroids     IBS (irritable bowel syndrome)     Sleep apnea     uses cpap       Past Surgical History:   Procedure Laterality Date    HX BREAST REDUCTION Bilateral 2013    HX  SECTION  08/10/2002    HX COLONOSCOPY  2022    Internal Hemroids    HX LAP CHOLECYSTECTOMY  09/2002    HX LAP GASTRIC BYPASS  08/04/2021    HX TOTAL LAPAROSCOPIC HYSTERECTOMY  03/15/2021       Current Outpatient Medications   Medication Sig Dispense Refill    phentermine (ADIPEX-P) 37.5 mg tablet Take 1 Tablet by mouth every morning. Max Daily Amount: 37.5 mg. May start with 1/2 tab for first week. 30 Tablet 0    Linzess 145 mcg cap capsule TAKE 1 CAPSULE BY MOUTH ONCE A DAY BEFORE MEALS      cyanocobalamin 1,000 mcg tablet Take 1,000 mcg by mouth daily. thiamine HCL (B-1) 100 mg tablet Take  by mouth daily. cholecalciferol, vitamin D3, 50 mcg (2,000 unit) tab Take  by mouth.      calcium-cholecalciferol, d3, (CALCIUM 600 + D) 600-125 mg-unit tab Take  by mouth.      multivitamin (ONE A DAY) tablet Take 1 Tablet by mouth daily. venlafaxine HCl (VENLAFAXINE PO) 300 mg daily. ARIPiprazole (ABILIFY) 15 mg tablet       traZODone (DESYREL) 100 mg tablet TAKE 2 TABLET BY MOUTH EVERY NIGHT AS NEEDED FOR SLEEP. No Known Allergies    ROS:  Review of Systems   Constitutional:  Positive for malaise/fatigue (Takes OTC energy pills due to work schedule). Negative for weight loss. Eyes:  Negative for blurred vision. Respiratory:  Negative for cough and shortness of breath. Cardiovascular:  Negative for chest pain and palpitations. Gastrointestinal:  Negative for abdominal pain, constipation, heartburn, nausea and vomiting. Musculoskeletal:  Negative for joint pain. Neurological:  Negative for dizziness, tingling and headaches. Psychiatric/Behavioral:  Positive for depression. Negative for substance abuse and suicidal ideas. The patient is nervous/anxious. The patient does not have insomnia.         Physicial Exam:  Visit Vitals  BP (!) 160/98 (BP 1 Location: Left upper arm)   Pulse 80   Temp 98 °F (36.7 °C)   Resp 16   Ht 5' 6\" (1.676 m)   Wt 103.9 kg (229 lb)   LMP 02/01/2021 (Exact Date)   SpO2 100%   BMI 36.96 kg/m²     Physical Exam  Vitals and nursing note reviewed. Constitutional:       Appearance: She is obese. HENT:      Head: Normocephalic and atraumatic. Eyes:      Pupils: Pupils are equal, round, and reactive to light. Cardiovascular:      Rate and Rhythm: Normal rate and regular rhythm. Pulmonary:      Effort: Pulmonary effort is normal.      Breath sounds: Normal breath sounds. Musculoskeletal:         General: Normal range of motion. Neurological:      Mental Status: She is alert and oriented to person, place, and time. Psychiatric:         Mood and Affect: Mood normal.         Behavior: Behavior normal.         Thought Content: Thought content normal.     Labs:   Recent Results (from the past 672 hour(s))   EKG, 12 LEAD, INITIAL    Collection Time: 11/23/22 10:39 AM   Result Value Ref Range    Ventricular Rate 76 BPM    Atrial Rate 76 BPM    P-R Interval 154 ms    QRS Duration 78 ms    Q-T Interval 378 ms    QTC Calculation (Bezet) 425 ms    Calculated P Axis 72 degrees    Calculated R Axis 53 degrees    Calculated T Axis 56 degrees    Diagnosis       Normal sinus rhythm  Possible Left atrial enlargement  Borderline ECG  When compared with ECG of 16-FEB-2021 09:56,  Nonspecific T wave abnormality no longer evident in Inferior leads  Nonspecific T wave abnormality no longer evident in Lateral leads  Confirmed by Edgar Gonzalez MD, --- ([id number]) on 11/23/2022 1:55:18 PM         Assessment/Plan:   She is currently 1 year 3 months s/p laparoscopic gastric bypass surgery with a total weight loss of 88 lbs to date. P was instructed to continue multivitamin/B1/B12/calcium/vit D supplementation. Extensive discussion regarding high density carbohydrates and their impacts on weight regain, hunger, cravings and reactive hypoglycemia. EKG ordered and reviewed with patient. She wishes to start phentermine--reviewed potential SE including: elevated HR, BP, increased anxiety, insomnia, thirst, and constipation.   Pt to start with 37.5mg tablet--1/2 tab early am--add 2nd half after 1st week if experiences increased hunger, but no later than noon to avoid insomnia. Ordered 30 tabs, no RF--pt understands will need monthly visits. Advised to monitor BP at home. Follow up with PCP if it remains elevated. We have reviewed the components of a successful postoperative course including requirement for a high protein, low carbohydrate diet, 64 oz a day of zero calorie liquids, daily vitamin supplementation, daily exercise (150 mins/week), regular follow-up, and participation in support groups. Refer to registered dietitian for more detailed medical nutrition therapy as needed. The primary encounter diagnosis was Encounter for weight loss counseling. Diagnoses of Post-resection malabsorption, S/P gastric bypass, Obesity (BMI 30-39.9), and Elevated blood pressure reading without diagnosis of hypertension were also pertinent to this visit. Follow-up and Dispositions    Return in about 4 weeks (around 12/21/2022) for Medication check . Sooner as needed.   Reina Bellamy NP

## 2022-11-23 NOTE — TELEPHONE ENCOUNTER
Spoke with pt regarding EKG results. Advised her to stop taking energy pills while on phentermine. Denies hx of cardiac issues. Reports no chest pain, shortness of breath, and dizziness. Still monitor BP at home, follow up with PCP if it remains elevated.

## 2022-11-23 NOTE — TELEPHONE ENCOUNTER
Patient called to let Alicia Adame NP know that she did get EKG done. She also states she work overnight and usually take energy pills to keep her up at night. She states her mother suggested maybe the energy pills are making BP high. Patient states she forgot to tell Alicia Adame about the energy pills. States BP was taken 3 times at office visit today. I notified Alicia Adame NP of message. Alicia Adame states will call patient to discuss.

## 2022-12-06 ENCOUNTER — TELEPHONE (OUTPATIENT)
Dept: SURGERY | Age: 42
End: 2022-12-06

## 2022-12-06 NOTE — TELEPHONE ENCOUNTER
Spoke with pt. Will try phentermine 37.5 mg tab (1/2 tab in am and 1/2 tab in pm) for appetite suppression. Follow up on 12/16/2022.

## 2022-12-16 ENCOUNTER — OFFICE VISIT (OUTPATIENT)
Dept: SURGERY | Age: 42
End: 2022-12-16
Payer: COMMERCIAL

## 2022-12-16 VITALS
TEMPERATURE: 98 F | HEIGHT: 66 IN | HEART RATE: 93 BPM | BODY MASS INDEX: 33.59 KG/M2 | OXYGEN SATURATION: 99 % | SYSTOLIC BLOOD PRESSURE: 123 MMHG | DIASTOLIC BLOOD PRESSURE: 83 MMHG | WEIGHT: 209 LBS

## 2022-12-16 DIAGNOSIS — K91.2 POST-RESECTION MALABSORPTION: Primary | ICD-10-CM

## 2022-12-16 DIAGNOSIS — Z98.84 S/P GASTRIC BYPASS: ICD-10-CM

## 2022-12-16 DIAGNOSIS — E66.9 OBESITY (BMI 30-39.9): ICD-10-CM

## 2022-12-16 DIAGNOSIS — Z79.899 FOLLOW-UP ENCOUNTER INVOLVING MEDICATION: ICD-10-CM

## 2022-12-16 DIAGNOSIS — Z71.3 ENCOUNTER FOR WEIGHT LOSS COUNSELING: ICD-10-CM

## 2022-12-16 RX ORDER — PHENTERMINE HYDROCHLORIDE 37.5 MG/1
37.5 TABLET ORAL
Qty: 30 TABLET | Refills: 0 | Status: SHIPPED | OUTPATIENT
Start: 2022-12-16

## 2022-12-16 NOTE — PROGRESS NOTES
Bariatric Postoperative Progress Note    Chief Complaint   Patient presents with    Weight Management     Medication follow up, LGBP 21     Jem Rao is a 38 y.o. female now 1 year 4 months status post laparoscopic gastric bypass surgery performed on 21. Follow up from 2022 visit. -23 lbs. Home scale 206 lbs. Doing well on phentermine. Denies SE. BP within range. Drinking protein shakes, and eating shrimp, fish, yogurt, turkey, eggs, occ cookies. Taking in 100 oz water,  g protein. 45-60 min of activity 5 days a week. Bowel movements are normal, denies symptoms of constipation. She is not having any pain. She is compliant with multivitamins, calcium, Vit D, B1, and B12 supplements. Emotional eating has improved. Seeing therapist next month.      Weight Loss Metrics 2022 2022 2022 2022 10/19/2022 10/19/2022 2022   Pre op / Initial Wt 317 - 317 - 317 - 317   Today's Wt - 209 lb - 229 lb - 220 lb -   BMI - 33.73 kg/m2 - 36.96 kg/m2 - 35.51 kg/m2 -   Ideal Body Wt 134 - 134 - 134 - 134   Excess Body Wt 183 - 183 - 183 - 183   Goal Wt 171 - 171 - 171 - 171   Wt loss to date 108 - 88 - 97 - 104   % Wt Loss 0.74 - 0.6 - 0.66 - 0.71   80% .4 - 146.4 - 146.4 - 146.4       Comorbidities:  Hypertension: improved  Diabetes: not applicable  Obstructive Sleep Apnea: resolved  Hyperlipidemia: not applicable  Stress Urinary Incontinence: improved  Gastroesophageal Reflux: not applicable  Weight related arthropathy:improved        Past Medical History:   Diagnosis Date    Depression     Fibroids     IBS (irritable bowel syndrome)     Sleep apnea     uses cpap       Past Surgical History:   Procedure Laterality Date    HX BREAST REDUCTION Bilateral 2013    HX  SECTION  08/10/2002    HX COLONOSCOPY  2022    Internal Hemroids    HX LAP CHOLECYSTECTOMY  2002    HX LAP GASTRIC BYPASS  2021    HX TOTAL LAPAROSCOPIC HYSTERECTOMY  03/15/2021 Current Outpatient Medications   Medication Sig Dispense Refill    phentermine (ADIPEX-P) 37.5 mg tablet Take 1 Tablet by mouth every morning. Max Daily Amount: 37.5 mg. 30 Tablet 0    Linzess 145 mcg cap capsule TAKE 1 CAPSULE BY MOUTH ONCE A DAY BEFORE MEALS      cyanocobalamin 1,000 mcg tablet Take 1,000 mcg by mouth daily. thiamine HCL (B-1) 100 mg tablet Take  by mouth daily. cholecalciferol, vitamin D3, 50 mcg (2,000 unit) tab Take  by mouth.      calcium-cholecalciferol, d3, (CALCIUM 600 + D) 600-125 mg-unit tab Take  by mouth.      multivitamin (ONE A DAY) tablet Take 1 Tablet by mouth daily. venlafaxine HCl (VENLAFAXINE PO) 300 mg daily. ARIPiprazole (ABILIFY) 15 mg tablet       traZODone (DESYREL) 100 mg tablet TAKE 2 TABLET BY MOUTH EVERY NIGHT AS NEEDED FOR SLEEP. No Known Allergies    ROS:  Review of Systems   Constitutional:  Negative for malaise/fatigue and weight loss. Eyes:  Negative for blurred vision. Respiratory:  Negative for cough and shortness of breath. Cardiovascular:  Negative for chest pain and palpitations. Gastrointestinal:  Negative for abdominal pain, constipation, heartburn, nausea and vomiting. Musculoskeletal:  Negative for joint pain. Neurological:  Negative for dizziness, tingling and headaches. Psychiatric/Behavioral:  Positive for depression (Followed by therapist). Negative for substance abuse and suicidal ideas. The patient is not nervous/anxious and does not have insomnia. Physicial Exam:  Visit Vitals  /83 (BP 1 Location: Right arm, BP Patient Position: Sitting)   Pulse 93   Temp 98 °F (36.7 °C) (Temporal)   Ht 5' 6\" (1.676 m)   Wt 94.8 kg (209 lb)   LMP 02/01/2021 (Exact Date)   SpO2 99%   BMI 33.73 kg/m²     Physical Exam  Vitals and nursing note reviewed. Constitutional:       Appearance: She is obese. HENT:      Head: Normocephalic and atraumatic.    Eyes:      Pupils: Pupils are equal, round, and reactive to light. Cardiovascular:      Rate and Rhythm: Normal rate. Pulmonary:      Effort: Pulmonary effort is normal.   Musculoskeletal:         General: Normal range of motion. Neurological:      Mental Status: She is alert and oriented to person, place, and time. Psychiatric:         Mood and Affect: Mood normal.         Behavior: Behavior normal.         Thought Content: Thought content normal.     Labs:   Recent Results (from the past 672 hour(s))   EKG, 12 LEAD, INITIAL    Collection Time: 11/23/22 10:39 AM   Result Value Ref Range    Ventricular Rate 76 BPM    Atrial Rate 76 BPM    P-R Interval 154 ms    QRS Duration 78 ms    Q-T Interval 378 ms    QTC Calculation (Bezet) 425 ms    Calculated P Axis 72 degrees    Calculated R Axis 53 degrees    Calculated T Axis 56 degrees    Diagnosis       Normal sinus rhythm  Possible Left atrial enlargement  Borderline ECG  When compared with ECG of 16-FEB-2021 09:56,  Nonspecific T wave abnormality no longer evident in Inferior leads  Nonspecific T wave abnormality no longer evident in Lateral leads  Confirmed by Edgar Emery MD, --- ([id number]) on 11/23/2022 1:55:18 PM         Assessment/Plan:   She is currently 1 year 4 months s/p laparoscopic gastric bypass surgery with a total weight loss of 108 lbs to date. Pt was instructed to continue multivitamin/B1/B12/calcium/vit D supplementation. Phentermine effective for weight loss. Pt would like to continue treatment. Refill phentermine 37.5mg tablet #30, R0. Follow up next month. We have reviewed the components of a successful postoperative course including requirement for a high protein, low carbohydrate diet, 64 oz a day of zero calorie liquids, daily vitamin supplementation, daily exercise (150 mins/week), regular follow-up, and participation in support groups. Refer to registered dietitian for more detailed medical nutrition therapy as needed. The primary encounter diagnosis was Post-resection malabsorption. Diagnoses of S/P gastric bypass, Obesity (BMI 30-39.9), Encounter for weight loss counseling, and Follow-up encounter involving medication were also pertinent to this visit. Follow-up and Dispositions    Return in about 4 weeks (around 1/13/2023) for Medication follow up. Sooner as needed.   Henrique Dowling NP

## 2023-01-18 ENCOUNTER — OFFICE VISIT (OUTPATIENT)
Dept: SURGERY | Age: 43
End: 2023-01-18
Payer: COMMERCIAL

## 2023-01-18 VITALS
RESPIRATION RATE: 16 BRPM | SYSTOLIC BLOOD PRESSURE: 143 MMHG | BODY MASS INDEX: 34.07 KG/M2 | DIASTOLIC BLOOD PRESSURE: 92 MMHG | HEIGHT: 66 IN | TEMPERATURE: 97.6 F | WEIGHT: 212 LBS | HEART RATE: 73 BPM

## 2023-01-18 DIAGNOSIS — K91.2 POST-RESECTION MALABSORPTION: Primary | ICD-10-CM

## 2023-01-18 DIAGNOSIS — Z79.899 FOLLOW-UP ENCOUNTER INVOLVING MEDICATION: ICD-10-CM

## 2023-01-18 DIAGNOSIS — Z98.84 S/P GASTRIC BYPASS: ICD-10-CM

## 2023-01-18 DIAGNOSIS — E66.9 OBESITY (BMI 30-39.9): ICD-10-CM

## 2023-01-18 DIAGNOSIS — Z71.3 ENCOUNTER FOR WEIGHT LOSS COUNSELING: ICD-10-CM

## 2023-01-18 RX ORDER — PHENTERMINE HYDROCHLORIDE 37.5 MG/1
37.5 TABLET ORAL
Qty: 30 TABLET | Refills: 0 | Status: SHIPPED | OUTPATIENT
Start: 2023-01-18

## 2023-01-18 NOTE — PROGRESS NOTES
Bariatric Postoperative Progress Note    Chief Complaint   Patient presents with    Weight Management     Medication follow up, LGBP 21     Paige Rosario is a 38 y.o. female now 1 year 5 months status post laparoscopic gastric bypass surgery performed on 21. Last seen on 2022. Medication follow up. +3 lbs. Home scale 204-205 lbs. Doing well on phentermine. Steady weight fluctuations. Clothes fit better. Denies SE other than dry mouth. Drinking protein shakes, and eating fruit, shrimp, fish, yogurt, turkey, eggs. Ate [employer] yesterday and experienced dumping symptoms. Taking in 100 oz water,  g protein. 45-60 min of activity 5 days a week. Bowel movements are normal, denies symptoms of constipation. She is not having any pain. She is compliant with multivitamins, calcium, Vit D, B1, and B12 supplements. Emotional eating and life stressors have improved. Plans to see therapist soon.      Weight Loss Metrics 2023 2023 2022 2022 2022 2022 10/19/2022   Pre op / Initial Wt 317 - 317 - 317 - 317   Today's Wt - 212 lb - 209 lb - 229 lb -   BMI - 34.22 kg/m2 - 33.73 kg/m2 - 36.96 kg/m2 -   Ideal Body Wt 134 - 134 - 134 - 134   Excess Body Wt 183 - 183 - 183 - 183   Goal Wt 171 - 171 - 171 - 171   Wt loss to date 105 - 108 - 88 - 97   % Wt Loss 0.72 - 0.74 - 0.6 - 0.66   80% .4 - 146.4 - 146.4 - 146.4     Comorbidities:  Hypertension: improved  Diabetes: not applicable  Obstructive Sleep Apnea: resolved  Hyperlipidemia: not applicable  Stress Urinary Incontinence: improved  Gastroesophageal Reflux: not applicable  Weight related arthropathy: improved      Past Medical History:   Diagnosis Date    Depression     Fibroids     IBS (irritable bowel syndrome)     Sleep apnea     uses cpap       Past Surgical History:   Procedure Laterality Date    HX BREAST REDUCTION Bilateral 2013    HX  SECTION  08/10/2002    HX COLONOSCOPY  2022    Internal Hemroids    HX LAP CHOLECYSTECTOMY  09/2002    HX LAP GASTRIC BYPASS  08/04/2021    HX TOTAL LAPAROSCOPIC HYSTERECTOMY  03/15/2021       Current Outpatient Medications   Medication Sig Dispense Refill    phentermine (ADIPEX-P) 37.5 mg tablet Take 1 Tablet by mouth every morning. Max Daily Amount: 37.5 mg. 30 Tablet 0    Linzess 145 mcg cap capsule TAKE 1 CAPSULE BY MOUTH ONCE A DAY BEFORE MEALS      cyanocobalamin 1,000 mcg tablet Take 1,000 mcg by mouth daily. thiamine HCL (B-1) 100 mg tablet Take  by mouth daily. cholecalciferol, vitamin D3, 50 mcg (2,000 unit) tab Take  by mouth.      calcium-cholecalciferol, d3, (CALCIUM 600 + D) 600-125 mg-unit tab Take  by mouth.      multivitamin (ONE A DAY) tablet Take 1 Tablet by mouth daily. venlafaxine HCl (VENLAFAXINE PO) 200 mg daily. ARIPiprazole (ABILIFY) 15 mg tablet       traZODone (DESYREL) 100 mg tablet TAKE 2 TABLET BY MOUTH EVERY NIGHT AS NEEDED FOR SLEEP. No Known Allergies    ROS:  Review of Systems   Constitutional:  Negative for malaise/fatigue and weight loss. Eyes:  Negative for blurred vision. Respiratory:  Negative for cough and shortness of breath. Cardiovascular:  Negative for chest pain and palpitations. Gastrointestinal:  Negative for abdominal pain, constipation, heartburn, nausea and vomiting. Musculoskeletal:  Negative for joint pain. Neurological:  Negative for dizziness, tingling and headaches. Psychiatric/Behavioral:  Negative for depression (Followed by therapist), substance abuse and suicidal ideas. The patient is not nervous/anxious and does not have insomnia. Physicial Exam:  Visit Vitals  BP (!) 143/92 (BP 1 Location: Left upper arm)   Pulse 73   Temp 97.6 °F (36.4 °C)   Resp 16   Ht 5' 6\" (1.676 m)   Wt 96.2 kg (212 lb)   LMP 02/01/2021 (Exact Date)   BMI 34.22 kg/m²     Physical Exam  Vitals and nursing note reviewed. Constitutional:       Appearance: She is obese.    HENT:      Head: Normocephalic and atraumatic. Eyes:      Pupils: Pupils are equal, round, and reactive to light. Cardiovascular:      Rate and Rhythm: Normal rate and regular rhythm. Pulmonary:      Effort: Pulmonary effort is normal.      Breath sounds: Normal breath sounds. Musculoskeletal:         General: Normal range of motion. Neurological:      Mental Status: She is alert and oriented to person, place, and time. Psychiatric:         Mood and Affect: Mood normal.         Behavior: Behavior normal.         Thought Content: Thought content normal.     Labs:   No results found for this or any previous visit (from the past 672 hour(s)). Assessment/Plan:   She is currently 1 year 5 months s/p laparoscopic gastric bypass surgery with a total weight loss of 105 lbs to date. Pt was instructed to continue multivitamin/B1/B12/calcium/vit D supplementation. Phentermine effective for weight management. Pt would like to continue treatment. Refill phentermine 37.5mg tablet #30, R0. Month #3. Follow up next month. Goal: <200 lbs    We have reviewed the components of a successful postoperative course including requirement for a high protein, low carbohydrate diet, 64 oz a day of zero calorie liquids, daily vitamin supplementation, daily exercise (150 mins/week), regular follow-up, and participation in support groups. Refer to registered dietitian for more detailed medical nutrition therapy as needed. The primary encounter diagnosis was Post-resection malabsorption. Diagnoses of S/P gastric bypass, Obesity (BMI 30-39.9), Encounter for weight loss counseling, and Follow-up encounter involving medication were also pertinent to this visit. Follow-up and Dispositions    Return in about 4 weeks (around 2/15/2023) for Medication follow up . Sooner as needed.   Jerica Roche NP

## 2023-02-15 ENCOUNTER — OFFICE VISIT (OUTPATIENT)
Age: 43
End: 2023-02-15
Payer: COMMERCIAL

## 2023-02-15 VITALS
SYSTOLIC BLOOD PRESSURE: 151 MMHG | OXYGEN SATURATION: 100 % | HEART RATE: 74 BPM | DIASTOLIC BLOOD PRESSURE: 103 MMHG | HEIGHT: 66 IN | TEMPERATURE: 98 F | BODY MASS INDEX: 33.27 KG/M2 | WEIGHT: 207 LBS

## 2023-02-15 DIAGNOSIS — Z79.899 FOLLOW-UP ENCOUNTER INVOLVING MEDICATION: ICD-10-CM

## 2023-02-15 DIAGNOSIS — K91.2 POSTSURGICAL MALABSORPTION, NOT ELSEWHERE CLASSIFIED: ICD-10-CM

## 2023-02-15 DIAGNOSIS — K91.2 POST-RESECTION MALABSORPTION: Primary | ICD-10-CM

## 2023-02-15 DIAGNOSIS — E66.09 CLASS 1 OBESITY DUE TO EXCESS CALORIES WITHOUT SERIOUS COMORBIDITY WITH BODY MASS INDEX (BMI) OF 33.0 TO 33.9 IN ADULT: ICD-10-CM

## 2023-02-15 DIAGNOSIS — Z98.84 S/P GASTRIC BYPASS: ICD-10-CM

## 2023-02-15 DIAGNOSIS — Z71.3 ENCOUNTER FOR WEIGHT LOSS COUNSELING: ICD-10-CM

## 2023-02-15 DIAGNOSIS — Z98.84 BARIATRIC SURGERY STATUS: ICD-10-CM

## 2023-02-15 PROCEDURE — 99214 OFFICE O/P EST MOD 30 MIN: CPT | Performed by: REGISTERED NURSE

## 2023-02-15 RX ORDER — PHENTERMINE HYDROCHLORIDE 37.5 MG/1
TABLET ORAL
Qty: 30 TABLET | Refills: 0 | Status: SHIPPED | OUTPATIENT
Start: 2023-02-15 | End: 2023-03-17

## 2023-02-15 RX ORDER — VENLAFAXINE 100 MG/1
200 TABLET ORAL DAILY
COMMUNITY
Start: 2022-06-13

## 2023-02-15 RX ORDER — PHENTERMINE HYDROCHLORIDE 37.5 MG/1
TABLET ORAL
COMMUNITY
Start: 2023-01-18 | End: 2023-02-15

## 2023-02-15 RX ORDER — SPIRONOLACTONE 50 MG/1
TABLET, FILM COATED ORAL
COMMUNITY
Start: 2022-12-25

## 2023-02-15 RX ORDER — ARIPIPRAZOLE 15 MG/1
TABLET ORAL
COMMUNITY
Start: 2023-02-02

## 2023-02-15 RX ORDER — LANOLIN ALCOHOL/MO/W.PET/CERES
CREAM (GRAM) TOPICAL DAILY
COMMUNITY

## 2023-02-15 NOTE — PROGRESS NOTES
Bariatric Postoperative Progress Note    Chief Complaint   Patient presents with    Weight Management     Medication follow up, LGBP 21     Stacy Mccrary is a 38 y.o. female now 18 months status post laparoscopic gastric bypass surgery performed on 21. Last seen on 2023. Medication follow up. -5 lbs. Would like to continue using phentermine. Helps with appetite suppression. States clothes fit better. Denies SE other than dry mouth. Drinking protein shakes, and eating fish, eggs, yogurt, broccoli, spinach, sugar free jello, occ sweets, pizza. Admits to emotional eating. Taking in 100 oz sugar free fluids, 75 g protein. 45-90 min of activity 5 days a week. Bowel movements are regular. She is compliant with multivitamins, calcium, Vit D, B1, and B12 supplements. Weight Loss Metrics 2023 2023 2022 2022 2022 2022 10/19/2022   Pre op / Initial Wt 317 - 317 - 317 - 317   Today's Wt - 212 lb - 209 lb - 229 lb -   BMI - 34.22 kg/m2 - 33.73 kg/m2 - 36.96 kg/m2 -   Ideal Body Wt 134 - 134 - 134 - 134   Excess Body Wt 183 - 183 - 183 - 183   Goal Wt 171 - 171 - 171 - 171   Wt loss to date 105 - 108 - 88 - 97   % Wt Loss 0.72 - 0.74 - 0.6 - 0.66   80% .4 - 146.4 - 146.4 - 146.4     Comorbidities:  Hypertension: Unable to assess. Pt works overnight and took caffeine supplement prior to appt.    Diabetes: not applicable  Obstructive Sleep Apnea: resolved  Hyperlipidemia: not applicable  Stress Urinary Incontinence: improved  Gastroesophageal Reflux: not applicable  Weight related arthropathy: improved      Past Medical History:   Diagnosis Date    Depression     Fibroids     IBS (irritable bowel syndrome)     Sleep apnea     uses cpap       Past Surgical History:   Procedure Laterality Date    HX BREAST REDUCTION Bilateral 2013    HX  SECTION  08/10/2002    HX COLONOSCOPY  2022    Internal Hemroids    HX LAP CHOLECYSTECTOMY  2002    HX LAP GASTRIC BYPASS  08/04/2021    HX TOTAL LAPAROSCOPIC HYSTERECTOMY  03/15/2021       Current Outpatient Medications   Medication Sig Dispense Refill    phentermine (ADIPEX-P) 37.5 mg tablet Take 1 Tablet by mouth every morning. Max Daily Amount: 37.5 mg. 30 Tablet 0    Linzess 145 mcg cap capsule TAKE 1 CAPSULE BY MOUTH ONCE A DAY BEFORE MEALS      cyanocobalamin 1,000 mcg tablet Take 1,000 mcg by mouth daily. thiamine HCL (B-1) 100 mg tablet Take  by mouth daily. cholecalciferol, vitamin D3, 50 mcg (2,000 unit) tab Take  by mouth.      calcium-cholecalciferol, d3, (CALCIUM 600 + D) 600-125 mg-unit tab Take  by mouth.      multivitamin (ONE A DAY) tablet Take 1 Tablet by mouth daily. venlafaxine HCl (VENLAFAXINE PO) 200 mg daily. ARIPiprazole (ABILIFY) 15 mg tablet       traZODone (DESYREL) 100 mg tablet TAKE 2 TABLET BY MOUTH EVERY NIGHT AS NEEDED FOR SLEEP. No Known Allergies    ROS:  Review of Systems   Constitutional:  Negative for malaise/fatigue and weight loss. Eyes:  Negative for blurred vision. Respiratory:  Negative for cough and shortness of breath. Cardiovascular:  Negative for chest pain and palpitations. Gastrointestinal:  Negative for abdominal pain, constipation, heartburn, nausea and vomiting. Musculoskeletal:  Negative for joint pain. Neurological:  Negative for dizziness, tingling and headaches. Psychiatric/Behavioral:  Negative for depression (Followed by therapist), substance abuse and suicidal ideas. The patient is not nervous/anxious and does not have insomnia. Physicial Exam:  Visit Vitals  BP (!) 143/92 (BP 1 Location: Left upper arm)   Pulse 73   Temp 97.6 °F (36.4 °C)   Resp 16   Ht 5' 6\" (1.676 m)   Wt 96.2 kg (212 lb)   LMP 02/01/2021 (Exact Date)   BMI 34.22 kg/m²     Physical Exam  Vitals and nursing note reviewed. Constitutional:       Appearance: She is obese. HENT:      Head: Normocephalic and atraumatic.    Eyes:      Pupils: Pupils are equal, round, and reactive to light. Cardiovascular:      Rate and Rhythm: Normal rate and regular rhythm. Pulmonary:      Effort: Pulmonary effort is normal.      Breath sounds: Normal breath sounds. Musculoskeletal:         General: Normal range of motion. Neurological:      Mental Status: She is alert and oriented to person, place, and time. Psychiatric:         Mood and Affect: Mood normal.         Behavior: Behavior normal.         Thought Content: Thought content normal.     Labs:   No results found for this or any previous visit (from the past 672 hour(s)). Assessment/Plan:   She is currently 18 months s/p laparoscopic gastric bypass surgery with a total weight loss of 110 lbs to date. Pt was instructed to continue multivitamin/B1/B12/calcium/vit D supplementation. Phentermine effective for appetite suppression. Pt would like to continue treatment. Refill phentermine 37.5mg tablet #30, R0. Month #4. (Order sent via pharmacy request). Follow up next month. Goal: <200 lbs. We have reviewed the components of a successful postoperative course including requirement for a high protein, low carbohydrate diet, 64 oz a day of zero calorie liquids, daily vitamin supplementation, daily exercise (150 mins/week), regular follow-up, and participation in support groups. Refer to registered dietitian for more detailed medical nutrition therapy as needed. The primary encounter diagnosis was Post-resection malabsorption. Diagnoses of S/P gastric bypass, Obesity (BMI 30-39.9), Encounter for weight loss counseling, and Follow-up encounter involving medication were also pertinent to this visit. Follow-up and Dispositions    Return in about 4 weeks (around 2/15/2023) for Medication follow up . Sooner as needed.   Collin Sibley NP

## 2023-03-01 ENCOUNTER — TELEPHONE (OUTPATIENT)
Age: 43
End: 2023-03-01

## 2023-03-01 NOTE — TELEPHONE ENCOUNTER
Patient requesting to speak with Ana Abdullahi NP regarding treatment for binge eating. Patient states she really trust Jory as a provider and would like to discuss this with her. Advised patient will forward message. Patient verbalized understanding.

## 2023-03-17 ENCOUNTER — OFFICE VISIT (OUTPATIENT)
Age: 43
End: 2023-03-17

## 2023-03-17 VITALS
WEIGHT: 209 LBS | HEART RATE: 60 BPM | DIASTOLIC BLOOD PRESSURE: 101 MMHG | OXYGEN SATURATION: 100 % | SYSTOLIC BLOOD PRESSURE: 148 MMHG | TEMPERATURE: 97 F | HEIGHT: 66 IN | BODY MASS INDEX: 33.59 KG/M2

## 2023-03-17 DIAGNOSIS — K91.2 POST-RESECTION MALABSORPTION: ICD-10-CM

## 2023-03-17 DIAGNOSIS — Z98.84 S/P GASTRIC BYPASS: ICD-10-CM

## 2023-03-17 DIAGNOSIS — Z71.3 ENCOUNTER FOR WEIGHT LOSS COUNSELING: Primary | ICD-10-CM

## 2023-03-17 DIAGNOSIS — E66.09 CLASS 1 OBESITY DUE TO EXCESS CALORIES WITHOUT SERIOUS COMORBIDITY WITH BODY MASS INDEX (BMI) OF 33.0 TO 33.9 IN ADULT: ICD-10-CM

## 2023-03-17 DIAGNOSIS — Z79.899 FOLLOW-UP ENCOUNTER INVOLVING MEDICATION: ICD-10-CM

## 2023-03-17 DIAGNOSIS — Z71.3 DIETARY COUNSELING AND SURVEILLANCE: ICD-10-CM

## 2023-03-17 RX ORDER — DIETHYLPROPION HYDROCHLORIDE 75 MG/1
75 TABLET ORAL DAILY
Qty: 30 TABLET | Refills: 0 | Status: SHIPPED | OUTPATIENT
Start: 2023-03-17 | End: 2023-04-16

## 2023-03-17 ASSESSMENT — ENCOUNTER SYMPTOMS
CHEST TIGHTNESS: 0
VOMITING: 1
SHORTNESS OF BREATH: 0

## 2023-03-17 NOTE — PROGRESS NOTES
Bariatric Postoperative Progress Note    Chief Complaint   Patient presents with    Weight Management     Medication follow up, LGBP 21     Robyn Urias is a 38 y.o. female now 23 months status post laparoscopic gastric bypass surgery performed on 21. Here for medication follow up and weight management. +2 lbs. On month #4 using phentermine. States not helping anymore for appetite suppression. Enrolled into IOP program for eating disorder. Pt unable to control hunger, does not feel satiety, and will continue to eat beyond excess causing nausea/vomiting. Drinking protein shakes, and eating eggs, yogurt, fish, chicken, rice, bread, pasta, sweets, take out. Taking in 80 oz sugar free fluids, 60 g protein. 90 min of activity 5 days a week. Bowel movements are regular. She is compliant with multivitamins, calcium, Vit D, B1, and B12 supplements. Weight Loss Metrics 3/17/2023   Pre-op weight (manual entry) 317 lbs   Height 5' 6\"   Weight 209 lbs   BMI (Calculated) 33.8 kg/m2   Ideal body weight (manual entry) 134 lbs   EBW in lbs. 183   Goal weight 171 lbs   Weight loss to date in lbs. 108   Percent weight loss (%) 34.07 %   Percent EBW loss (%) 59 %   Some recent data might be hidden      Comorbidities:  Hypertension: Unable to assess. Elevated today. Pt works overnight and took caffeine supplement prior to appt. Asymptomatic.    Diabetes: not applicable  Obstructive Sleep Apnea: resolved  Hyperlipidemia: not applicable  Stress Urinary Incontinence: improved  Gastroesophageal Reflux: not applicable  Weight related arthropathy: improved      Past Medical History:   Diagnosis Date    Depression     Fibroids     IBS (irritable bowel syndrome)     Sleep apnea     uses cpap       Past Surgical History:   Procedure Laterality Date    BREAST REDUCTION SURGERY Bilateral 2013     SECTION  08/10/2002    CHOLECYSTECTOMY, LAPAROSCOPIC  2002    COLONOSCOPY  2022    Internal Hemroids    GASTRIC BYPASS SURGERY  08/04/2021    HYSTERECTOMY (CERVIX STATUS UNKNOWN)  03/15/2021       Current Outpatient Medications   Medication Sig Dispense Refill    Diethylpropion HCl CR 75 MG TB24 Take 75 mg by mouth daily for 30 days. Max Daily Amount: 75 mg 30 tablet 0    ARIPiprazole (ABILIFY) 15 MG tablet       Calcium Carbonate-Vitamin D 600-3. 125 MG-MCG TABS Take by mouth      Cholecalciferol 50 MCG (2000 UT) TABS Take by mouth      cyanocobalamin 1000 MCG tablet Take 1,000 mcg by mouth daily      linaclotide (LINZESS) 145 MCG capsule TAKE 1 CAPSULE BY MOUTH ONCE A DAY BEFORE MEALS      spironolactone (ALDACTONE) 50 MG tablet PLEASE SEE ATTACHED FOR DETAILED DIRECTIONS      thiamine 100 MG tablet Take by mouth daily      venlafaxine (EFFEXOR) 100 MG tablet 200 mg daily      Multiple Vitamin (MULTIVITAMIN ADULT PO) Take by mouth       No current facility-administered medications for this visit. No Known Allergies    ROS:  Review of Systems   Constitutional:  Negative for fatigue and unexpected weight change. Eyes:  Negative for visual disturbance. Respiratory:  Negative for chest tightness and shortness of breath. Cardiovascular:  Negative for chest pain and palpitations. Gastrointestinal:  Positive for vomiting (Due to overeating). Genitourinary: Negative. Musculoskeletal:  Negative for arthralgias. Neurological:  Negative for dizziness, light-headedness and headaches. Psychiatric/Behavioral:  Negative for self-injury, sleep disturbance and suicidal ideas. The patient is not nervous/anxious. Treated for depression      BP (!) 148/101 (Site: Left Upper Arm, Position: Sitting)   Pulse 60   Temp 97 °F (36.1 °C) (Temporal)   Ht 5' 6\" (1.676 m)   Wt 209 lb (94.8 kg)   SpO2 100%   BMI 33.73 kg/m²     Physical Exam  Vitals and nursing note reviewed. Constitutional:       General: She is not in acute distress. HENT:      Head: Normocephalic and atraumatic.    Eyes:      Pupils: Pupils are equal, round, and reactive to light. Cardiovascular:      Rate and Rhythm: Normal rate. Pulmonary:      Effort: Pulmonary effort is normal.   Neurological:      Mental Status: She is alert and oriented to person, place, and time. Psychiatric:         Mood and Affect: Mood normal.         Behavior: Behavior normal.         Thought Content: Thought content normal.      Labs:   No results found for this or any previous visit (from the past 672 hour(s)). Assessment/Plan:   Chronic, recurring obesity. S/p gastric bypass 19 months ago, total weight loss 110 lbs to date. Will discontinue phentermine and order diethylpropion 75 mg tab ER, #30, R0. Take 1 tab a day mid-morning, 1 hr before breakfast, but no later than noon to avoid insomnia. Reviewed potential SE including: elevated HR, BP, increased anxiety, insomnia, thirst, and constipation. Monitor BP daily. Continue monthly visits. Emphasized obesity is a chronic disease and commended pt on her efforts to seek assistance via enrolling into the IOP program.     We have reviewed the components of a successful postoperative course including requirement for a high protein, low carbohydrate diet, 64 oz a day of zero calorie liquids, daily vitamin supplementation, daily exercise (150 mis/week), regular follow-up, and participation in support groups. Refer to registered dietitian for more detailed medical nutrition therapy as needed. The primary encounter diagnosis was Encounter for weight loss counseling. Diagnoses of Post-resection malabsorption, S/P gastric bypass, Class 1 obesity due to excess calories without serious comorbidity with body mass index (BMI) of 33.0 to 33.9 in adult, Dietary counseling and surveillance, and Follow-up encounter involving medication were also pertinent to this visit. Return in about 4 weeks (around 4/14/2023) for medication follow up . Sooner as needed.   JULIANA George NP

## 2023-05-08 ENCOUNTER — CLINICAL DOCUMENTATION (OUTPATIENT)
Facility: HOSPITAL | Age: 43
End: 2023-05-08

## 2023-05-08 ENCOUNTER — HOSPITAL ENCOUNTER (OUTPATIENT)
Facility: HOSPITAL | Age: 43
Discharge: HOME | End: 2023-05-11

## 2023-05-08 NOTE — PROGRESS NOTES
on meat and vegetables and avoiding carbohydrates. I have talked with patient about reactive hypoglycemia and although carbohydrates are not good from a weight-management point of view, they are actually very dangerous and should be avoided. If patient is getting hungry between meals focus on meat and vegetables and a list of food choices was reviewed with patient. Reinforced to patient the importance of being properly hydrated and the need to get 64 ounces of fluid in per day. Reinforced to patient the need to do 30 minutes of exercise per day. We also spent some time talking about the vitamins and the importance of taking them. Reinforced the dosage of vitamins to patient. Patient was reminded that the vitamins are lifelong. Other Pertinent Information: D/C herself from eating disorder program IOP. Smart:   Patient will eat slower. Wants to recognize her fullness queues. Will research [address] [place] [facility].       Maya Wellington, KANIKA  5/8/2023

## 2023-05-10 ENCOUNTER — OFFICE VISIT (OUTPATIENT)
Age: 43
End: 2023-05-10
Payer: COMMERCIAL

## 2023-05-10 VITALS
SYSTOLIC BLOOD PRESSURE: 130 MMHG | HEART RATE: 97 BPM | WEIGHT: 211 LBS | BODY MASS INDEX: 33.91 KG/M2 | TEMPERATURE: 97.8 F | DIASTOLIC BLOOD PRESSURE: 89 MMHG | HEIGHT: 66 IN | RESPIRATION RATE: 16 BRPM

## 2023-05-10 DIAGNOSIS — E66.9 CLASS 1 OBESITY WITHOUT SERIOUS COMORBIDITY WITH BODY MASS INDEX (BMI) OF 34.0 TO 34.9 IN ADULT, UNSPECIFIED OBESITY TYPE: ICD-10-CM

## 2023-05-10 DIAGNOSIS — Z71.3 ENCOUNTER FOR WEIGHT LOSS COUNSELING: ICD-10-CM

## 2023-05-10 DIAGNOSIS — Z71.3 DIETARY COUNSELING AND SURVEILLANCE: ICD-10-CM

## 2023-05-10 DIAGNOSIS — Z98.84 S/P GASTRIC BYPASS: Primary | ICD-10-CM

## 2023-05-10 DIAGNOSIS — Z79.899 FOLLOW-UP ENCOUNTER INVOLVING MEDICATION: ICD-10-CM

## 2023-05-10 PROCEDURE — 99214 OFFICE O/P EST MOD 30 MIN: CPT | Performed by: REGISTERED NURSE

## 2023-05-10 RX ORDER — SEMAGLUTIDE 0.25 MG/.5ML
0.25 INJECTION, SOLUTION SUBCUTANEOUS
Qty: 2 ML | Refills: 0 | Status: SHIPPED | OUTPATIENT
Start: 2023-05-10

## 2023-05-10 ASSESSMENT — ENCOUNTER SYMPTOMS
VOMITING: 0
SHORTNESS OF BREATH: 0
CHEST TIGHTNESS: 0
NAUSEA: 0

## 2023-05-10 NOTE — PROGRESS NOTES
Bariatric Postoperative Progress Note    Chief Complaint   Patient presents with    Follow-up     Weight management, medication check. Esteban Merlin is a 38 y.o. female now 21 months status post laparoscopic gastric bypass surgery performed on 8/4/21. Here for medication follow up and weight management. +4 lbs. On month #2 using diethylpropion, not effective for appetite suppression. Enrolled in an IOP program. Completed one month. Was recently discharged. States she learned a lot---able to use effective coping skills regarding hunger management and how to recognize satiety. No longer eats beyond fullness, denies n/v. Drinking protein shakes, and eating eggs, broccoli, string beans, fish, chicken, cauliflower rice, bread, occ take out. Taking in 80 oz sugar free fluids, 60 g protein. 90 min of activity 5 days a week. Meeting with RD monthly. Sees a therapist. Bowel movements are regular. She is compliant with multivitamins, calcium, Vit D, B1, and B12 supplements. Listening to bariatric podcast, journaling.      Weight Loss Metrics 5/10/2023 4/14/2023 3/17/2023 2/15/2023 1/18/2023 12/16/2022 11/23/2022   Pre-op weight (manual entry) - 317 lbs 317 lbs 317 lbs - - -   Height 5' 6\" 5' 6\" 5' 6\" 5' 6\" 5' 6\" 5' 6\" 5' 6\"   Weight 211 lbs 207 lbs 209 lbs 207 lbs 212 lbs 209 lbs 229 lbs   BMI (Calculated) 34.1 kg/m2 33.5 kg/m2 33.8 kg/m2 33.5 kg/m2 34.3 kg/m2 33.8 kg/m2 37 kg/m2   Ideal body weight (manual entry) - 134 lbs 134 lbs 134 lbs - - -   EBW in lbs. - 183 183 183 - - -   Goal weight - 171 lbs 171 lbs 171 lbs - - -   Weight loss to date in lbs. - 110 108 110 - - -   Percent weight loss (%) - 34.7 % 34.07 % 34.7 % - - -   Percent EBW loss (%) - 60.1 % 59 % - - - -      Comorbidities:  Hypertension: Improved  Diabetes: not applicable  Obstructive Sleep Apnea: resolved  Hyperlipidemia: not applicable  Stress Urinary Incontinence: resolved  Gastroesophageal Reflux: not applicable  Weight related arthropathy:

## 2023-05-10 NOTE — PROGRESS NOTES
Aries Castañeda is a 38 y.o. female (: 1980)     Chief Complaint   Patient presents with    Follow-up     Medication check. Medication list and allergies have been reviewed with Chrisseverino Castañeda and updated as of today's date. I have gone over all Medical, Surgical and Social History with Aries Castañeda and updated/added the information accordingly. 1. Have you been to the ER, urgent care clinic since your last visit? Hospitalized since your last visit? No    2. Have you seen or consulted any other health care providers outside of the [address] [place] [address] since your last visit? Include any pap smears or colon screening. Yes therapy.

## 2023-06-09 ENCOUNTER — OFFICE VISIT (OUTPATIENT)
Age: 43
End: 2023-06-09

## 2023-06-09 VITALS
HEIGHT: 66 IN | SYSTOLIC BLOOD PRESSURE: 148 MMHG | TEMPERATURE: 98 F | HEART RATE: 80 BPM | BODY MASS INDEX: 33.27 KG/M2 | DIASTOLIC BLOOD PRESSURE: 97 MMHG | WEIGHT: 207 LBS

## 2023-06-09 DIAGNOSIS — Z71.3 DIETARY COUNSELING AND SURVEILLANCE: ICD-10-CM

## 2023-06-09 DIAGNOSIS — Z71.3 ENCOUNTER FOR WEIGHT LOSS COUNSELING: Primary | ICD-10-CM

## 2023-06-09 DIAGNOSIS — Z79.899 FOLLOW-UP ENCOUNTER INVOLVING MEDICATION: ICD-10-CM

## 2023-06-09 DIAGNOSIS — Z98.84 S/P GASTRIC BYPASS: ICD-10-CM

## 2023-06-09 RX ORDER — SEMAGLUTIDE 0.5 MG/.5ML
0.5 INJECTION, SOLUTION SUBCUTANEOUS
Qty: 2 ML | Refills: 0 | Status: SHIPPED | OUTPATIENT
Start: 2023-06-09

## 2023-06-09 ASSESSMENT — ENCOUNTER SYMPTOMS
CHEST TIGHTNESS: 0
VOMITING: 0
SHORTNESS OF BREATH: 0
NAUSEA: 0

## 2023-06-09 NOTE — PROGRESS NOTES
LAPAROSCOPIC  09/2002    COLONOSCOPY  09/06/2022    Internal Hemroids    GASTRIC BYPASS SURGERY  08/04/2021    HYSTERECTOMY (CERVIX STATUS UNKNOWN)  03/15/2021       Current Outpatient Medications   Medication Sig Dispense Refill    Semaglutide-Weight Management (WEGOVY) 0.5 MG/0.5ML SOAJ SC injection Inject 0.5 mg into the skin every 7 days Weeks 5-8 2 mL 0    ARIPiprazole (ABILIFY) 15 MG tablet       Calcium Carbonate-Vitamin D 600-3. 125 MG-MCG TABS Take by mouth      Cholecalciferol 50 MCG (2000 UT) TABS Take by mouth      cyanocobalamin 1000 MCG tablet Take 1 tablet by mouth daily      linaclotide (LINZESS) 145 MCG capsule TAKE 1 CAPSULE BY MOUTH ONCE A DAY BEFORE MEALS      thiamine 100 MG tablet Take by mouth daily      venlafaxine (EFFEXOR) 100 MG tablet 2 tablets daily      Multiple Vitamin (MULTIVITAMIN ADULT PO) Take by mouth       No current facility-administered medications for this visit. Allergies   Allergen Reactions    Metoclopramide Other (See Comments)     ROS:  Review of Systems   Constitutional:  Negative for fatigue and unexpected weight change. Eyes:  Negative for visual disturbance. Respiratory:  Negative for chest tightness and shortness of breath. Cardiovascular:  Negative for chest pain and palpitations. Gastrointestinal:  Negative for nausea and vomiting. Genitourinary: Negative. Musculoskeletal:  Negative for arthralgias. Neurological:  Negative for dizziness, light-headedness and headaches. Psychiatric/Behavioral:  Negative for self-injury, sleep disturbance and suicidal ideas. The patient is not nervous/anxious. BP (!) 148/97 (Site: Right Upper Arm, Position: Sitting)   Pulse 80   Temp 98 °F (36.7 °C) (Temporal)   Ht 5' 6\" (1.676 m)   Wt 207 lb (93.9 kg)   BMI 33.41 kg/m²     Physical Exam  Vitals and nursing note reviewed. Constitutional:       General: She is not in acute distress. HENT:      Head: Normocephalic and atraumatic.    Eyes:      Pupils:

## 2023-06-14 DIAGNOSIS — Z71.3 DIETARY COUNSELING AND SURVEILLANCE: ICD-10-CM

## 2023-06-14 DIAGNOSIS — E66.9 CLASS 1 OBESITY WITHOUT SERIOUS COMORBIDITY WITH BODY MASS INDEX (BMI) OF 34.0 TO 34.9 IN ADULT, UNSPECIFIED OBESITY TYPE: ICD-10-CM

## 2023-06-14 DIAGNOSIS — Z98.84 S/P GASTRIC BYPASS: ICD-10-CM

## 2023-06-14 DIAGNOSIS — Z71.3 ENCOUNTER FOR WEIGHT LOSS COUNSELING: ICD-10-CM

## 2023-06-14 DIAGNOSIS — Z79.899 FOLLOW-UP ENCOUNTER INVOLVING MEDICATION: ICD-10-CM

## 2023-06-19 ENCOUNTER — TELEPHONE (OUTPATIENT)
Age: 43
End: 2023-06-19

## 2023-06-19 NOTE — TELEPHONE ENCOUNTER
Pt called requesting a refill on Wegovy. Pt states the next dose . 5 mg isn't available and would like to either continue taking . 25 mg or go up to 1 mg.

## 2023-06-19 NOTE — TELEPHONE ENCOUNTER
Pt called requesting a refill on Wegovy. Pt states the next dose . 5 mg isn't available and would like to either continue taking . 25 or go up to 1

## 2023-06-20 RX ORDER — SEMAGLUTIDE 0.25 MG/.5ML
INJECTION, SOLUTION SUBCUTANEOUS
Qty: 2 ML | Refills: 0 | Status: SHIPPED | OUTPATIENT
Start: 2023-06-20

## 2023-06-21 DIAGNOSIS — E66.9 CLASS 1 OBESITY WITHOUT SERIOUS COMORBIDITY WITH BODY MASS INDEX (BMI) OF 34.0 TO 34.9 IN ADULT, UNSPECIFIED OBESITY TYPE: ICD-10-CM

## 2023-06-21 DIAGNOSIS — Z71.3 DIETARY COUNSELING AND SURVEILLANCE: ICD-10-CM

## 2023-06-21 DIAGNOSIS — Z98.84 S/P GASTRIC BYPASS: Primary | ICD-10-CM

## 2023-06-21 DIAGNOSIS — Z79.899 FOLLOW-UP ENCOUNTER INVOLVING MEDICATION: ICD-10-CM

## 2023-06-21 DIAGNOSIS — Z71.3 ENCOUNTER FOR WEIGHT LOSS COUNSELING: ICD-10-CM

## 2023-06-21 RX ORDER — LIRAGLUTIDE 6 MG/ML
INJECTION, SOLUTION SUBCUTANEOUS
Qty: 5 ADJUSTABLE DOSE PRE-FILLED PEN SYRINGE | Refills: 4 | Status: SHIPPED | OUTPATIENT
Start: 2023-06-21

## 2023-06-21 NOTE — PROGRESS NOTES
Wegovy out of stock. Tolerated the .25 mg dose without SE. Pt agreeable to switch to Saxenda. Sent to pharmacy on file. Medication discussed at last follow up. Plan to reinforce education if approved.

## 2023-06-23 ENCOUNTER — TELEPHONE (OUTPATIENT)
Age: 43
End: 2023-06-23

## 2023-07-17 ENCOUNTER — CLINICAL DOCUMENTATION (OUTPATIENT)
Facility: HOSPITAL | Age: 43
End: 2023-07-17

## 2023-07-17 NOTE — PROGRESS NOTES
KANIKA not able to reach patient at the scheduled phone visit. KANIKA left a message and sent an e-mail to reschedule this appointment.   Renny Clements RD

## 2023-07-19 ENCOUNTER — OFFICE VISIT (OUTPATIENT)
Age: 43
End: 2023-07-19
Payer: MEDICAID

## 2023-07-19 VITALS
BODY MASS INDEX: 31.34 KG/M2 | DIASTOLIC BLOOD PRESSURE: 79 MMHG | TEMPERATURE: 98.4 F | HEART RATE: 91 BPM | HEIGHT: 66 IN | SYSTOLIC BLOOD PRESSURE: 124 MMHG | RESPIRATION RATE: 16 BRPM | WEIGHT: 195 LBS

## 2023-07-19 DIAGNOSIS — Z98.84 S/P GASTRIC BYPASS: ICD-10-CM

## 2023-07-19 DIAGNOSIS — Z71.3 ENCOUNTER FOR WEIGHT LOSS COUNSELING: ICD-10-CM

## 2023-07-19 DIAGNOSIS — Z79.899 FOLLOW-UP ENCOUNTER INVOLVING MEDICATION: ICD-10-CM

## 2023-07-19 DIAGNOSIS — Z79.899 FOLLOW-UP ENCOUNTER INVOLVING MEDICATION: Primary | ICD-10-CM

## 2023-07-19 DIAGNOSIS — E66.9 CLASS 1 OBESITY WITHOUT SERIOUS COMORBIDITY WITH BODY MASS INDEX (BMI) OF 34.0 TO 34.9 IN ADULT, UNSPECIFIED OBESITY TYPE: ICD-10-CM

## 2023-07-19 PROCEDURE — 99214 OFFICE O/P EST MOD 30 MIN: CPT | Performed by: REGISTERED NURSE

## 2023-07-19 RX ORDER — LIRAGLUTIDE 6 MG/ML
INJECTION, SOLUTION SUBCUTANEOUS
Qty: 5 ADJUSTABLE DOSE PRE-FILLED PEN SYRINGE | Refills: 4 | Status: SHIPPED | OUTPATIENT
Start: 2023-07-19

## 2023-07-19 ASSESSMENT — ENCOUNTER SYMPTOMS
VOMITING: 0
NAUSEA: 0
CHEST TIGHTNESS: 0
SHORTNESS OF BREATH: 0

## 2023-07-19 NOTE — PROGRESS NOTES
according to the prescription info reviewed with pt. Pt aware of risk of hypoglycemia. She will notify me of any SE/SS. We reviewed the components of a successful postoperative course including requirement for a high protein, low carbohydrate diet, 64 oz a day of zero calorie liquids, daily vitamin supplementation, daily exercise (150 mis/week), regular follow-up, and participation in support groups. The primary encounter diagnosis was Follow-up encounter involving medication. Diagnoses of S/P gastric bypass, Encounter for weight loss counseling, and BMI 31.0-31.9,adult were also pertinent to this visit. Return in about 2 months (around 9/19/2023) for Medication follow up . Sooner as needed.   Leslie Smith APRN - NP

## 2023-07-20 ENCOUNTER — HOSPITAL ENCOUNTER (OUTPATIENT)
Facility: HOSPITAL | Age: 43
Discharge: HOME | End: 2023-07-23

## 2023-07-20 LAB — SENTARA SPECIMEN COLLECTION: NORMAL

## 2023-07-21 LAB
A/G RATIO: 1.4 RATIO (ref 1.1–2.6)
ALBUMIN SERPL-MCNC: 4.6 G/DL (ref 3.5–5)
ALP BLD-CCNC: 57 U/L (ref 25–115)
ALT SERPL-CCNC: 14 U/L (ref 5–40)
ANION GAP SERPL CALCULATED.3IONS-SCNC: 16 MMOL/L (ref 3–15)
AST SERPL-CCNC: 22 U/L (ref 10–37)
BASOPHILS # BLD: 1 % (ref 0–2)
BASOPHILS ABSOLUTE: 0 K/UL (ref 0–0.2)
BILIRUB SERPL-MCNC: 0.6 MG/DL (ref 0.2–1.2)
BUN BLDV-MCNC: 12 MG/DL (ref 6–22)
CALCIUM SERPL-MCNC: 9.8 MG/DL (ref 8.4–10.5)
CHLORIDE BLD-SCNC: 101 MMOL/L (ref 98–110)
CO2: 24 MMOL/L (ref 20–32)
CREAT SERPL-MCNC: 0.8 MG/DL (ref 0.5–1.2)
EOSINOPHIL # BLD: 0 % (ref 0–6)
EOSINOPHILS ABSOLUTE: 0 K/UL (ref 0–0.5)
FERRITIN: 202 NG/ML (ref 10–291)
GLOBULIN: 3.2 G/DL (ref 2–4)
GLOMERULAR FILTRATION RATE: >60 ML/MIN/1.73 SQ.M.
GLUCOSE: 73 MG/DL (ref 70–99)
HCT VFR BLD CALC: 45 % (ref 35.1–46.5)
HEMOGLOBIN: 14 G/DL (ref 11.7–15.5)
IRON % SATURATION: 24 % (ref 20–50)
IRON: 90 MCG/DL (ref 30–160)
LYMPHOCYTES # BLD: 29 % (ref 20–45)
LYMPHOCYTES ABSOLUTE: 1.2 K/UL (ref 1–4.8)
MCH RBC QN AUTO: 28 PG (ref 26–34)
MCHC RBC AUTO-ENTMCNC: 31 G/DL (ref 31–36)
MCV RBC AUTO: 90 FL (ref 80–99)
MONOCYTES ABSOLUTE: 0.3 K/UL (ref 0.1–1)
MONOCYTES: 7 % (ref 3–12)
NEUTROPHILS ABSOLUTE: 2.5 K/UL (ref 1.8–7.7)
NEUTROPHILS: 63 % (ref 40–75)
PDW BLD-RTO: 14 % (ref 10–15.5)
PLATELET # BLD: 219 K/UL (ref 140–440)
PMV BLD AUTO: 12.3 FL (ref 9–13)
POTASSIUM SERPL-SCNC: 3.6 MMOL/L (ref 3.5–5.5)
RBC: 4.98 M/UL (ref 3.8–5.2)
SODIUM BLD-SCNC: 141 MMOL/L (ref 133–145)
TOTAL IRON BINDING CAPACITY: 368 MCG/DL (ref 228–428)
TOTAL PROTEIN: 7.8 G/DL (ref 6.4–8.3)
UIBC: 278 MCG/DL (ref 110–370)
VITAMIN B-12: >2000 PG/ML (ref 211–911)
VITAMIN D 25-HYDROXY: 90.3 NG/ML (ref 32–100)
WBC: 4 K/UL (ref 4–11)

## 2023-07-24 LAB — THIAMINE BLOOD: 186 NMOL/L (ref 78–185)

## 2023-07-26 ENCOUNTER — TELEPHONE (OUTPATIENT)
Age: 43
End: 2023-07-26

## 2023-07-26 NOTE — TELEPHONE ENCOUNTER
Called pt with lab results done on 7/20/2023. All questions answered. May switch to oral AOMs if GLP-1s remain on back order. Pt agreeable to plan.

## 2023-09-06 ENCOUNTER — OFFICE VISIT (OUTPATIENT)
Age: 43
End: 2023-09-06

## 2023-09-06 VITALS
BODY MASS INDEX: 33.11 KG/M2 | SYSTOLIC BLOOD PRESSURE: 128 MMHG | HEIGHT: 66 IN | WEIGHT: 206 LBS | OXYGEN SATURATION: 100 % | TEMPERATURE: 97 F | DIASTOLIC BLOOD PRESSURE: 86 MMHG | HEART RATE: 81 BPM

## 2023-09-06 DIAGNOSIS — E66.09 CLASS 1 OBESITY DUE TO EXCESS CALORIES WITHOUT SERIOUS COMORBIDITY WITH BODY MASS INDEX (BMI) OF 33.0 TO 33.9 IN ADULT: ICD-10-CM

## 2023-09-06 DIAGNOSIS — Z79.899 FOLLOW-UP ENCOUNTER INVOLVING MEDICATION: Primary | ICD-10-CM

## 2023-09-06 DIAGNOSIS — Z98.84 S/P GASTRIC BYPASS: ICD-10-CM

## 2023-09-06 DIAGNOSIS — Z71.3 ENCOUNTER FOR WEIGHT LOSS COUNSELING: ICD-10-CM

## 2023-09-06 NOTE — PROGRESS NOTES
Pupils: Pupils are equal, round, and reactive to light. Cardiovascular:      Rate and Rhythm: Normal rate. Pulmonary:      Effort: Pulmonary effort is normal.   Neurological:      Mental Status: She is alert and oriented to person, place, and time. Psychiatric:         Mood and Affect: Mood normal.         Behavior: Behavior normal.         Thought Content: Thought content normal.      Labs:   No results found for this or any previous visit (from the past 672 hour(s)). Assessment/Plan:   Chronic, recurring obesity. S/p gastric bypass 2 years ago, total weight loss 111 lbs to date. Experiencing weight regain. -5 lbs since starting Back on Track. Until GLP-1s become available, consider other AOMs. Provided information on Contrave. Pt will discuss with provider. We reviewed the components of a successful postoperative course including requirement for a high protein, low carbohydrate diet, 64 oz a day of zero calorie liquids, daily vitamin supplementation, daily exercise (150 mis/week), regular follow-up, and participation in support groups. Refer to registered dietitian for more detailed medical nutrition therapy as needed. Pt agreeable to schedule appt. The primary encounter diagnosis was Follow-up encounter involving medication. Diagnoses of Encounter for weight loss counseling, S/P gastric bypass, and Class 1 obesity due to excess calories without serious comorbidity with body mass index (BMI) of 33.0 to 33.9 in adult were also pertinent to this visit. Return in about 4 weeks (around 10/4/2023) for weight management . Sooner as needed.   JULIANA Plata - HUNG

## 2023-09-12 ENCOUNTER — HOSPITAL ENCOUNTER (OUTPATIENT)
Facility: HOSPITAL | Age: 43
Discharge: HOME | End: 2023-09-15

## 2023-09-12 ENCOUNTER — CLINICAL DOCUMENTATION (OUTPATIENT)
Facility: HOSPITAL | Age: 43
End: 2023-09-12

## 2023-09-12 NOTE — PROGRESS NOTES
KANIKA spoke with patient today regarding previous set SMART goals. Patient is eating slower for her breakfast meal but needs to work to slow down for her dinner meal.  Patient is getting 60 grams of protein per day. Patient has begun to read the 1st chapter and plans to read more of the suggested book. Future SMART goals:  Patient will try to sleep in cool, dark room and turn TV off at least 5 nights of the week. Patient will try to slow her eating down in the evening. Patient wants to attend the support group. 4.  She will continue moving forward in the suggested book. We are set to speak to one another in October.   Arabella Rees RD

## 2023-10-31 ENCOUNTER — CLINICAL DOCUMENTATION (OUTPATIENT)
Facility: HOSPITAL | Age: 43
End: 2023-10-31

## 2023-12-27 ENCOUNTER — TELEPHONE (OUTPATIENT)
Age: 43
End: 2023-12-27

## 2023-12-27 NOTE — TELEPHONE ENCOUNTER
----- Message from Brenda Valadez, [address] [place] [address] - NP sent at 12/26/2023  2:26 PM EST -----  Regarding: please call pt and schedule appt  Hello,     Please call pt and schedule appt to discuss possible revision. Thank you!   Carmel Cowart

## 2023-12-27 NOTE — TELEPHONE ENCOUNTER
Spoke to the patient about the appointment. The patient will call back to schedule an appointment with Annabel Oquendo.

## 2024-05-08 ENCOUNTER — TELEPHONE (OUTPATIENT)
Age: 44
End: 2024-05-08

## 2024-05-08 NOTE — TELEPHONE ENCOUNTER
Patient called office to inquire about refill/PA for medication saxenda 18mg.  States submitted refill on 04/15/24.  States was told by pharmacy a fax was sent to office for PA.  Per Teri ( RACHEL) patient need a follow up appt before a PA can be submitted.  Patient last seen on 09/26/23.  Advised patient will need a follow up appt to discuss medication and need new weight documented.  Patient verbalized understanding.  Patient scheduled for a follow up for 05/09/24 @ 9:30 with HUNG Corley @ [facility] location.  Patient aware appt is at the [facility] and confirmed she knows address to [facility].

## 2024-05-09 ENCOUNTER — OFFICE VISIT (OUTPATIENT)
Age: 44
End: 2024-05-09
Payer: MEDICAID

## 2024-05-09 VITALS
SYSTOLIC BLOOD PRESSURE: 130 MMHG | RESPIRATION RATE: 18 BRPM | TEMPERATURE: 97.6 F | OXYGEN SATURATION: 99 % | HEART RATE: 77 BPM | BODY MASS INDEX: 33.59 KG/M2 | HEIGHT: 66 IN | WEIGHT: 209 LBS | DIASTOLIC BLOOD PRESSURE: 81 MMHG

## 2024-05-09 DIAGNOSIS — Z98.84 S/P GASTRIC BYPASS: ICD-10-CM

## 2024-05-09 DIAGNOSIS — Z79.899 FOLLOW-UP ENCOUNTER INVOLVING MEDICATION: Primary | ICD-10-CM

## 2024-05-09 DIAGNOSIS — Z71.3 ENCOUNTER FOR WEIGHT LOSS COUNSELING: ICD-10-CM

## 2024-05-09 PROCEDURE — 99214 OFFICE O/P EST MOD 30 MIN: CPT | Performed by: REGISTERED NURSE

## 2024-05-09 RX ORDER — BUSPIRONE HYDROCHLORIDE 15 MG/1
15 TABLET ORAL 3 TIMES DAILY PRN
COMMUNITY

## 2024-05-09 RX ORDER — LIRAGLUTIDE 6 MG/ML
INJECTION, SOLUTION SUBCUTANEOUS
Qty: 5 ADJUSTABLE DOSE PRE-FILLED PEN SYRINGE | Refills: 4 | Status: SHIPPED | OUTPATIENT
Start: 2024-05-09 | End: 2024-05-09

## 2024-05-09 RX ORDER — PROPRANOLOL HYDROCHLORIDE 10 MG/1
10 TABLET ORAL 3 TIMES DAILY
COMMUNITY
Start: 2024-03-16

## 2024-05-09 RX ORDER — MAGNESIUM 30 MG
30 TABLET ORAL 2 TIMES DAILY
COMMUNITY

## 2024-05-09 RX ORDER — LIRAGLUTIDE 6 MG/ML
INJECTION, SOLUTION SUBCUTANEOUS
Qty: 5 ADJUSTABLE DOSE PRE-FILLED PEN SYRINGE | Refills: 4 | Status: SHIPPED | OUTPATIENT
Start: 2024-05-09

## 2024-05-09 NOTE — PROGRESS NOTES
Bariatric Postoperative Progress Note    Chief Complaint   Patient presents with    Weight Management     LGBP (2021)     Melida Del Castillo is a 43 y.o. female now 3 years status post laparoscopic gastric bypass surgery performed on 21.    Here for medication follow up. Due for PA renewal. Was doing well on Saxenda, unable to continue therapy due to shortage. Denies SE.         2024     9:00 AM 2023    11:29 AM 2023     1:31 PM 2023    11:42 AM 5/10/2023     9:31 AM 2023    11:08 AM 3/17/2023    10:16 AM   Weight Loss Metrics   Pre-op weight (manual entry) 317 lbs 317 lbs 317 lbs 317 lbs  317 lbs 317 lbs   Height 5' 6\" 5' 6\" 5' 6\" 5' 6\" 5' 6\" 5' 6\" 5' 6\"   Weight - Scale 209 lbs 206 lbs 195 lbs 207 lbs 211 lbs 207 lbs 209 lbs   BMI (Calculated) 33.8 kg/m2 33.3 kg/m2 31.5 kg/m2 33.5 kg/m2 34.1 kg/m2 33.5 kg/m2 33.8 kg/m2   Ideal body weight (manual entry) 134 lbs 134 lbs 134 lbs 134 lbs  134 lbs 134 lbs   EBW in lbs. 183 183 183 183  183 183   Goal weight      171 lbs 171 lbs   Weight loss to date in lbs. 108 111 122 110  110 108   Percent weight loss (%) 34.07 % 35.02 % 38.49 % 34.7 %  34.7 % 34.07 %   Percent EBW loss (%) 59 % 60.7 % 66.7 % 60.1 %  60.1 % 59 %      Comorbidities:  Hypertension: resolved  Diabetes: not applicable  Obstructive Sleep Apnea: resolved  Hyperlipidemia: not applicable  Stress Urinary Incontinence: resolved  Gastroesophageal Reflux: not applicable  Weight related arthropathy: resolved      Past Medical History:   Diagnosis Date    Depression     Fibroids     IBS (irritable bowel syndrome)     Sleep apnea     uses cpap     Past Surgical History:   Procedure Laterality Date    BREAST REDUCTION SURGERY Bilateral 2013     SECTION  08/10/2002    CHOLECYSTECTOMY, LAPAROSCOPIC  2002    COLONOSCOPY  2022    Internal Hemroids    GASTRIC BYPASS SURGERY  2021    HYSTERECTOMY (CERVIX STATUS UNKNOWN)  03/15/2021     Current Outpatient Medications

## 2024-05-09 NOTE — PROGRESS NOTES
Bariatric Postoperative Nurse Note      Melida Del Castillo is a 43 y.o. female status post laparoscopic gastric bypass surgery performed on 08/04/2021.    All Post-Ops (including two weeks)  -# of grams of protein daily? 60g  -sources of protein? Chicken, protein shakes, greek yogurt, Tilalpia, eggs.  -# of oz of sugar free fluids from all sources daily?  80oz daily.  -Nausea? No  -Vomiting? No  -Difficulty swallow/food sticking? No  -Heartburn/regurgitation? No  -Character of bowel movements (diarrhea/constipation/bloody stools?) regular  -Which multivitamin product are you taking? Flintstones   -What dose and how frequently are you taking calcium citrate? 500mg 3x daily  - from any iron-containing multivitamin by 2 hours? Yes  -Ulcer risk exposures:   NSAID Yes. Rare Ibuprofen use, PRN. Discussed GI risks, pt verbalized understanding.   Tobacco No  Alcohol No  Steroids No  -Minutes of physical activity and what type? Cardio and strength training 7 days weekly x 1 hour.

## 2024-05-14 ENCOUNTER — CLINICAL DOCUMENTATION (OUTPATIENT)
Age: 44
End: 2024-05-14

## 2024-05-22 ENCOUNTER — TELEPHONE (OUTPATIENT)
Age: 44
End: 2024-05-22

## 2024-05-22 NOTE — TELEPHONE ENCOUNTER
Pt is interested in skin removal surgery and unsure if she needs to make an appt to get a referral to plastic surgery.

## 2024-05-31 ENCOUNTER — TELEPHONE (OUTPATIENT)
Age: 44
End: 2024-05-31

## 2024-08-27 ENCOUNTER — TELEPHONE (OUTPATIENT)
Age: 44
End: 2024-08-27

## 2024-08-27 NOTE — TELEPHONE ENCOUNTER
Patient called because she is interested in revision surgery. She feels like the weight management medication is not working, she is always hungry, and she has not met her goal weight. She had surgery back in 2021 with Dr. Bauer. Would you like me to schedule her for a follow up with you or should she see one of the Doctor's for a consultation?

## 2024-09-12 ENCOUNTER — TELEPHONE (OUTPATIENT)
Age: 44
End: 2024-09-12

## 2024-09-23 ENCOUNTER — OFFICE VISIT (OUTPATIENT)
Age: 44
End: 2024-09-23
Payer: MEDICAID

## 2024-09-23 VITALS
TEMPERATURE: 97 F | HEIGHT: 66 IN | BODY MASS INDEX: 30.53 KG/M2 | OXYGEN SATURATION: 98 % | HEART RATE: 90 BPM | SYSTOLIC BLOOD PRESSURE: 132 MMHG | DIASTOLIC BLOOD PRESSURE: 85 MMHG | WEIGHT: 190 LBS

## 2024-09-23 DIAGNOSIS — Z71.3 ENCOUNTER FOR WEIGHT LOSS COUNSELING: ICD-10-CM

## 2024-09-23 DIAGNOSIS — Z79.899 FOLLOW-UP ENCOUNTER INVOLVING MEDICATION: ICD-10-CM

## 2024-09-23 DIAGNOSIS — E66.811 OBESITY (BMI 30.0-34.9): ICD-10-CM

## 2024-09-23 DIAGNOSIS — E66.9 OBESITY (BMI 30.0-34.9): ICD-10-CM

## 2024-09-23 DIAGNOSIS — Z98.84 S/P GASTRIC BYPASS: ICD-10-CM

## 2024-09-23 DIAGNOSIS — Z79.899 FOLLOW-UP ENCOUNTER INVOLVING MEDICATION: Primary | ICD-10-CM

## 2024-09-23 PROCEDURE — 99214 OFFICE O/P EST MOD 30 MIN: CPT | Performed by: REGISTERED NURSE

## 2024-09-23 RX ORDER — DESVENLAFAXINE 50 MG/1
50 TABLET, FILM COATED, EXTENDED RELEASE ORAL EVERY MORNING
COMMUNITY
Start: 2024-09-12

## 2024-09-23 RX ORDER — SEMAGLUTIDE 0.25 MG/.5ML
0.25 INJECTION, SOLUTION SUBCUTANEOUS
Qty: 2 ML | Refills: 0 | Status: SHIPPED | OUTPATIENT
Start: 2024-09-23

## 2024-09-23 RX ORDER — LURASIDONE HYDROCHLORIDE 40 MG/1
40 TABLET, FILM COATED ORAL
COMMUNITY
Start: 2024-09-12

## 2024-09-23 RX ORDER — SEMAGLUTIDE 0.5 MG/.5ML
0.5 INJECTION, SOLUTION SUBCUTANEOUS
Qty: 2 ML | Refills: 0 | Status: SHIPPED | OUTPATIENT
Start: 2024-09-23

## 2024-09-26 ENCOUNTER — TELEPHONE (OUTPATIENT)
Age: 44
End: 2024-09-26

## 2024-10-01 ENCOUNTER — TELEPHONE (OUTPATIENT)
Age: 44
End: 2024-10-01

## 2024-10-01 NOTE — TELEPHONE ENCOUNTER
Advised the patient about the approval for the Wegovy.  ID number is [id number] 9/30/2024 to 03/28/2025

## 2024-11-25 ENCOUNTER — OFFICE VISIT (OUTPATIENT)
Age: 44
End: 2024-11-25
Payer: MEDICAID

## 2024-11-25 VITALS
HEIGHT: 66 IN | DIASTOLIC BLOOD PRESSURE: 85 MMHG | BODY MASS INDEX: 30.86 KG/M2 | OXYGEN SATURATION: 95 % | HEART RATE: 87 BPM | TEMPERATURE: 98 F | SYSTOLIC BLOOD PRESSURE: 141 MMHG | WEIGHT: 192 LBS

## 2024-11-25 DIAGNOSIS — E66.811 OBESITY (BMI 30.0-34.9): ICD-10-CM

## 2024-11-25 DIAGNOSIS — Z98.84 S/P GASTRIC BYPASS: ICD-10-CM

## 2024-11-25 DIAGNOSIS — Z79.899 FOLLOW-UP ENCOUNTER INVOLVING MEDICATION: Primary | ICD-10-CM

## 2024-11-25 DIAGNOSIS — Z71.3 ENCOUNTER FOR WEIGHT LOSS COUNSELING: ICD-10-CM

## 2024-11-25 PROCEDURE — 99214 OFFICE O/P EST MOD 30 MIN: CPT | Performed by: REGISTERED NURSE

## 2024-11-25 RX ORDER — SEMAGLUTIDE 1.7 MG/.75ML
1.7 INJECTION, SOLUTION SUBCUTANEOUS
Qty: 3 ML | Refills: 0 | Status: SHIPPED | OUTPATIENT
Start: 2024-11-25

## 2024-11-25 RX ORDER — SEMAGLUTIDE 1 MG/.5ML
1 INJECTION, SOLUTION SUBCUTANEOUS
Qty: 2 ML | Refills: 0 | Status: SHIPPED | OUTPATIENT
Start: 2024-11-25

## 2024-11-25 RX ORDER — ARIPIPRAZOLE 10 MG/1
10 TABLET ORAL DAILY
COMMUNITY

## 2024-11-25 NOTE — PROGRESS NOTES
Bariatric Postoperative Progress Note    Chief Complaint   Patient presents with    Weight Management     L- Loyda      Melida Del Castillo is a 43 y.o. female now 3 years status post laparoscopic gastric bypass surgery performed on 21.    Here for medication follow up. Wegovy Month #1, 0.25 mg. Denies SE. -0 lbs since last visit. Unable to continue with titration dose of 0.5 mg due to backorder.     See nurse note for additional subjective information. Body composition scanned to media.         2024    10:00 AM 2024     9:00 AM 2024     9:00 AM 2023    11:29 AM 2023     1:31 PM 2023    11:42 AM 5/10/2023     9:31 AM   Weight Loss Metrics   Pre-op weight (manual entry) 317 lbs 317 lbs 317 lbs 317 lbs 317 lbs 317 lbs    Height 5' 6\" 5' 6\" 5' 6\" 5' 6\" 5' 6\" 5' 6\" 5' 6\"   Weight - Scale 192 lbs 190 lbs 209 lbs 206 lbs 195 lbs 207 lbs 211 lbs   BMI (Calculated) 31.1 kg/m2 30.7 kg/m2 33.8 kg/m2 33.3 kg/m2 31.5 kg/m2 33.5 kg/m2 34.1 kg/m2   Ideal body weight (manual entry) 137 lbs 134 lbs 134 lbs 134 lbs 134 lbs 134 lbs    EBW in lbs. 180 183 183 183 183 183    Weight loss to date in lbs. 125 127 108 111 122 110    Percent weight loss (%) 39.43 % 40.06 % 34.07 % 35.02 % 38.49 % 34.7 %    Percent EBW loss (%) 69.4 % 69.4 % 59 % 60.7 % 66.7 % 60.1 %       Comorbidities:  Hypertension: resolved  Diabetes: not applicable  Obstructive Sleep Apnea: resolved  Hyperlipidemia: not applicable  Stress Urinary Incontinence: resolved  Gastroesophageal Reflux: not applicable  Weight related arthropathy: resolved      Past Medical History:   Diagnosis Date    Depression     Fibroids     IBS (irritable bowel syndrome)     Sleep apnea     uses cpap     Past Surgical History:   Procedure Laterality Date    BREAST REDUCTION SURGERY Bilateral 2013     SECTION  08/10/2002    CHOLECYSTECTOMY, LAPAROSCOPIC  2002    COLONOSCOPY  2022    Internal Hemroids    GASTRIC BYPASS SURGERY  2021    
[facility] Nurse Note:     CC: Weight Management    Melida Del Castillo is a 43 y.o. female who is here for her f/up medical provider visit for the Medication program.     Did you have any problems adhering to the program since last visit? Yes If yes, please explain: Unable to obtain medication due to backorder    Since your last visit, have you experienced any complications? No    Have you received any other medical care since last visit? No  If yes, where and for what?     Have you had any change in your medications since your last visit? Yes If yes what? Started Effexor and Abilify    Are you taking an anti-obesity medication? No If yes what and are you experiencing any side effects? Stopped Wegovy 0.25 mg about 2-3 weeks ago. Unable to find 0.5 mg dosage    Would you still like to continue your current medication and dosage?  Yes    BP Readings from Last 3 Encounters:   09/23/24 132/85   05/09/24 130/81   09/06/23 128/86      Eating Habits Over Last Week:    How many oz of sugar-free fluids are you consuming? 100-120 oz     Did you consume your prescribed meal replacement regimen each day? N/A    Physical Activity Routine:    Aerobic exercise: 5 days a week    Resistance exercise: 5 days a week    
no

## 2025-01-08 PROBLEM — K58.1 IRRITABLE BOWEL SYNDROME WITH CONSTIPATION: Status: ACTIVE | Noted: 2018-08-21

## 2025-01-08 PROBLEM — F31.9 BIPOLAR DISORDER (HCC): Status: ACTIVE | Noted: 2022-08-01

## 2025-01-08 PROBLEM — F31.60 BIPOLAR AFFECTIVE DISORDER, CURRENT EPISODE MIXED (HCC): Status: ACTIVE | Noted: 2024-10-29

## 2025-01-08 PROBLEM — G47.33 OSA (OBSTRUCTIVE SLEEP APNEA): Status: ACTIVE | Noted: 2025-01-08

## 2025-01-08 PROBLEM — E66.01 MORBID OBESITY: Status: ACTIVE | Noted: 2018-08-21

## 2025-01-08 PROBLEM — K62.5 RECTAL BLEEDING: Status: ACTIVE | Noted: 2022-07-17

## 2025-01-08 PROBLEM — D50.9 MICROCYTIC ANEMIA: Status: ACTIVE | Noted: 2017-11-08

## 2025-01-08 PROBLEM — N92.0 MENORRHAGIA WITH REGULAR CYCLE: Status: ACTIVE | Noted: 2021-03-15

## 2025-01-08 PROBLEM — L70.0 ACNE VULGARIS: Status: ACTIVE | Noted: 2022-08-01

## 2025-01-08 PROBLEM — L98.7 EXCESS SKIN OF ABDOMEN: Status: ACTIVE | Noted: 2024-06-13

## 2025-01-08 PROBLEM — F98.8 ATTENTION DEFICIT DISORDER (ADD) WITHOUT HYPERACTIVITY: Status: ACTIVE | Noted: 2018-08-21

## 2025-01-08 PROBLEM — D50.0 ANEMIA DUE TO CHRONIC BLOOD LOSS: Status: ACTIVE | Noted: 2021-03-15

## 2025-01-08 RX ORDER — HYDROCORTISONE 25 MG/G
CREAM TOPICAL
COMMUNITY
End: 2025-01-17

## 2025-01-08 RX ORDER — CLINDAMYCIN PHOSPHATE 10 UG/ML
LOTION TOPICAL
COMMUNITY
End: 2025-01-17

## 2025-01-08 RX ORDER — HYDROCORTISONE VALERATE 2 MG/G
OINTMENT TOPICAL
COMMUNITY

## 2025-01-08 RX ORDER — LINACLOTIDE 145 UG/1
CAPSULE, GELATIN COATED ORAL
COMMUNITY
Start: 2024-10-29

## 2025-01-08 RX ORDER — CLINDAMYCIN PHOSPHATE AND BENZOYL PEROXIDE 10; 50 MG/G; MG/G
GEL TOPICAL
COMMUNITY
End: 2025-01-17

## 2025-01-08 RX ORDER — CICLOPIROX 1 G/100ML
SHAMPOO TOPICAL
COMMUNITY
End: 2025-01-17

## 2025-01-08 RX ORDER — HYDROXYZINE PAMOATE 25 MG/1
CAPSULE ORAL
COMMUNITY
Start: 2024-10-08 | End: 2025-01-17

## 2025-01-08 RX ORDER — TRETINOIN 1 MG/G
CREAM TOPICAL
COMMUNITY
Start: 2024-10-29

## 2025-01-08 RX ORDER — FERROUS SULFATE 325(65) MG
1 TABLET ORAL DAILY
COMMUNITY
End: 2025-01-17

## 2025-01-08 RX ORDER — DEXTROAMPHETAMINE SACCHARATE, AMPHETAMINE ASPARTATE MONOHYDRATE, DEXTROAMPHETAMINE SULFATE AND AMPHETAMINE SULFATE 7.5; 7.5; 7.5; 7.5 MG/1; MG/1; MG/1; MG/1
1 CAPSULE, EXTENDED RELEASE ORAL EVERY MORNING
COMMUNITY
End: 2025-01-17

## 2025-01-08 RX ORDER — VENLAFAXINE 100 MG/1
200 TABLET ORAL DAILY
COMMUNITY
Start: 2024-10-23

## 2025-01-08 RX ORDER — FLUOCINOLONE ACETONIDE 0.11 MG/ML
OIL TOPICAL
COMMUNITY
End: 2025-01-17

## 2025-01-08 RX ORDER — ONDANSETRON 4 MG/1
TABLET, ORALLY DISINTEGRATING ORAL
COMMUNITY
End: 2025-01-17

## 2025-01-17 ENCOUNTER — OFFICE VISIT (OUTPATIENT)
Age: 45
End: 2025-01-17

## 2025-01-17 VITALS
HEART RATE: 79 BPM | DIASTOLIC BLOOD PRESSURE: 84 MMHG | OXYGEN SATURATION: 97 % | RESPIRATION RATE: 16 BRPM | WEIGHT: 169.6 LBS | SYSTOLIC BLOOD PRESSURE: 126 MMHG | BODY MASS INDEX: 27.26 KG/M2 | HEIGHT: 66 IN | TEMPERATURE: 97.5 F

## 2025-01-17 DIAGNOSIS — E66.811 OBESITY (BMI 30.0-34.9): ICD-10-CM

## 2025-01-17 DIAGNOSIS — Z98.84 S/P GASTRIC BYPASS: ICD-10-CM

## 2025-01-17 DIAGNOSIS — Z71.3 ENCOUNTER FOR DIETARY COUNSELING AND SURVEILLANCE: ICD-10-CM

## 2025-01-17 DIAGNOSIS — Z79.899 FOLLOW-UP ENCOUNTER INVOLVING MEDICATION: Primary | ICD-10-CM

## 2025-01-17 DIAGNOSIS — Z86.39 HX OF OBESITY: ICD-10-CM

## 2025-01-17 DIAGNOSIS — Z71.3 ENCOUNTER FOR WEIGHT LOSS COUNSELING: ICD-10-CM

## 2025-01-17 RX ORDER — SEMAGLUTIDE 2.4 MG/.75ML
2.4 INJECTION, SOLUTION SUBCUTANEOUS
Qty: 3 ML | Refills: 2 | Status: SHIPPED | OUTPATIENT
Start: 2025-01-17 | End: 2025-01-17

## 2025-01-17 RX ORDER — SEMAGLUTIDE 2.4 MG/.75ML
2.4 INJECTION, SOLUTION SUBCUTANEOUS
Qty: 3 ML | Refills: 2 | Status: SHIPPED | OUTPATIENT
Start: 2025-01-17

## 2025-01-17 RX ORDER — CARIPRAZINE 1.5 MG/1
1.5 CAPSULE, GELATIN COATED ORAL DAILY
COMMUNITY
Start: 2025-01-09

## 2025-01-17 NOTE — PROGRESS NOTES
Chief Complaint   Patient presents with    Weight Management     Wegovy     Follow-up     LGBP 8.4.2021   1. Have you been to the ER, urgent care clinic since your last visit?  Hospitalized since your last visit? No    2. Have you seen or consulted any other health care providers outside of the [facility] since your last visit?  Include any pap smears or colon screening. No      Bariatric Postoperative Nurse Note    Melida Del Castillo is a 44 y.o. female status post laparoscopic gastric bypass surgery performed on 8.4.2021.    All Post-Ops (including two weeks)  -# of grams of protein daily?    -sources of protein? Egg, greek yogurt, tuna, tilipia, chicken  -# of oz of sugar free fluids from all sources daily? 64  -Nausea? No  -Vomiting? No  -Difficulty swallow/food sticking? No  -Heartburn/regurgitation? No  -Character of bowel movements (diarrhea/constipation/bloody stools?) diarrhea  -Which multivitamin product are you taking? Flintstones   -What dose and how frequently are you taking calcium citrate? 3 times a day   - from any iron-containing multivitamin by 2 hours?  Aware  -Ulcer risk exposures:   NSAID No  Tobacco No  Alcohol No  Steroids No  -Minutes of physical activity and what type? Workout daily for 1 hour

## 2025-02-06 NOTE — Clinical Note
Critical lab reported. WBC: 0.9. ANC: 0.4.     Md and Np made aware.    Luanne pt but you did her 6 week so not sure if this should go to you or Raimundo Grider. 3 months s/p bypass. Doing very well with diet and weight loss, however not eating a lot of different proteins-just fish, shrimp, and protein shake because she didn't think she was supposed to until she was told by RD. Discussed that she can try chicken/beef/pork. Do you mind reaching out?

## 2025-04-02 DIAGNOSIS — E66.811 OBESITY (BMI 30.0-34.9): ICD-10-CM

## 2025-04-02 DIAGNOSIS — Z98.84 S/P GASTRIC BYPASS: ICD-10-CM

## 2025-04-02 DIAGNOSIS — Z71.3 ENCOUNTER FOR WEIGHT LOSS COUNSELING: ICD-10-CM

## 2025-04-02 DIAGNOSIS — Z79.899 FOLLOW-UP ENCOUNTER INVOLVING MEDICATION: ICD-10-CM

## 2025-04-05 DIAGNOSIS — E66.811 OBESITY (BMI 30.0-34.9): ICD-10-CM

## 2025-04-05 DIAGNOSIS — Z71.3 ENCOUNTER FOR WEIGHT LOSS COUNSELING: ICD-10-CM

## 2025-04-05 DIAGNOSIS — Z79.899 FOLLOW-UP ENCOUNTER INVOLVING MEDICATION: ICD-10-CM

## 2025-04-05 DIAGNOSIS — Z98.84 S/P GASTRIC BYPASS: ICD-10-CM

## 2025-04-05 RX ORDER — SEMAGLUTIDE 2.4 MG/.75ML
2.4 INJECTION, SOLUTION SUBCUTANEOUS
Qty: 3 ML | Refills: 5 | Status: SHIPPED | OUTPATIENT
Start: 2025-04-05

## 2025-04-08 DIAGNOSIS — E66.811 OBESITY (BMI 30.0-34.9): ICD-10-CM

## 2025-04-08 DIAGNOSIS — Z98.84 S/P GASTRIC BYPASS: ICD-10-CM

## 2025-04-08 DIAGNOSIS — Z71.3 ENCOUNTER FOR WEIGHT LOSS COUNSELING: ICD-10-CM

## 2025-04-08 DIAGNOSIS — Z79.899 FOLLOW-UP ENCOUNTER INVOLVING MEDICATION: ICD-10-CM

## 2025-04-08 RX ORDER — PHENTERMINE HYDROCHLORIDE 15 MG/1
CAPSULE ORAL
Refills: 0 | OUTPATIENT
Start: 2025-04-08

## 2025-04-28 ENCOUNTER — OFFICE VISIT (OUTPATIENT)
Age: 45
End: 2025-04-28
Payer: COMMERCIAL

## 2025-04-28 VITALS
BODY MASS INDEX: 24.27 KG/M2 | WEIGHT: 151 LBS | RESPIRATION RATE: 12 BRPM | OXYGEN SATURATION: 100 % | HEART RATE: 89 BPM | TEMPERATURE: 97.7 F | DIASTOLIC BLOOD PRESSURE: 99 MMHG | SYSTOLIC BLOOD PRESSURE: 146 MMHG | HEIGHT: 66 IN

## 2025-04-28 DIAGNOSIS — Z79.899 FOLLOW-UP ENCOUNTER INVOLVING MEDICATION: Primary | ICD-10-CM

## 2025-04-28 DIAGNOSIS — Z98.84 S/P GASTRIC BYPASS: ICD-10-CM

## 2025-04-28 DIAGNOSIS — Z71.3 ENCOUNTER FOR WEIGHT LOSS COUNSELING: ICD-10-CM

## 2025-04-28 DIAGNOSIS — Z86.39 HX OF OBESITY: ICD-10-CM

## 2025-04-28 PROCEDURE — 99214 OFFICE O/P EST MOD 30 MIN: CPT | Performed by: REGISTERED NURSE

## 2025-04-28 RX ORDER — PHENTERMINE HYDROCHLORIDE 37.5 MG/1
37.5 TABLET ORAL
Qty: 30 TABLET | Refills: 2 | Status: SHIPPED | OUTPATIENT
Start: 2025-04-28 | End: 2025-07-27

## 2025-04-28 NOTE — PROGRESS NOTES
Bariatric Postoperative Progress Note    Chief Complaint   Patient presents with    Follow-up     MWL, hx of LGBP 21     Melida Del Castillo is a 44 y.o. female status post laparoscopic gastric bypass surgery performed on 21.    MWL follow up. Wegovy month #6. 2.4 mg inj. Denies SE. -18 lbs since last visit. >5% TBWL. Pleased with results, at goal weight. Medication no longer covered by insurance. Remains consistent with diet and exercise efforts but requests an AOM to assist with breakthrough hunger. Continues to see therapist routinely to help with symptoms of emotional eating.     See nurse note for additional subjective information.         2025     8:39 AM 2025     9:00 AM 2024    10:00 AM 2024     9:00 AM 2024     9:00 AM 2023    11:29 AM 2023     1:31 PM   Weight Loss Metrics   Pre-op weight (manual entry) 317 lbs 317 lbs 317 lbs 317 lbs 317 lbs 317 lbs 317 lbs   Height 5' 6\" 5' 6\" 5' 6\" 5' 6\" 5' 6\" 5' 6\" 5' 6\"   Weight - Scale 151 lbs 169 lbs 10 oz 192 lbs 190 lbs 209 lbs 206 lbs 195 lbs   BMI (Calculated) 24.4 kg/m2 27.4 kg/m2 31.1 kg/m2 30.7 kg/m2 33.8 kg/m2 33.3 kg/m2 31.5 kg/m2   Ideal body weight (manual entry) 134 lbs 137 lbs 137 lbs 134 lbs 134 lbs 134 lbs 134 lbs   EBW in lbs. 183 180 180 183 183 183 183   Goal weight 150 lbs         Weight loss to date in lbs. 166 147 125 127 108 111 122   Percent weight loss (%) 52.37 % 46.5 % 39.43 % 40.06 % 34.07 % 35.02 % 38.49 %   Percent EBW loss (%) 90.7 % 81.7 % 69.4 % 69.4 % 59 % 60.7 % 66.7 %      Comorbidities:  Hypertension: resolved  Diabetes: not applicable  Obstructive Sleep Apnea: resolved  Hyperlipidemia: not applicable  Gastroesophageal Reflux: not applicable    Past Medical History:   Diagnosis Date    Depression     Fibroids     IBS (irritable bowel syndrome)     Sleep apnea     uses cpap     Past Surgical History:   Procedure Laterality Date    BREAST REDUCTION SURGERY Bilateral 2013     SECTION   Monitor FS AC/HS, sliding scale Insulin, lantus and pre meal.   Endocrine following. Titrating insulin while on steroids Monitor FS AC/HS, q 6 when NPO. sliding scale Insulin ordered per endocrine.   Endocrine following. Titrating insulin while on steroids

## 2025-04-28 NOTE — PROGRESS NOTES
Bariatric Postoperative Nurse Note    Melida Del Castillo is a 44 y.o. female status post laparoscopic gastric bypass surgery performed on 8/4/2021.    All Post-Ops (including two weeks)  -# of grams of protein daily? 50-75 g  -sources of protein? Tuna, tilapia, chicken   -# of oz of sugar free fluids from all sources daily? 100 oz  -Nausea? No  -Vomiting? No  -Difficulty swallow/food sticking? No  -Heartburn/regurgitation? No  -Character of bowel movements (diarrhea/constipation/bloody stools?) IBS, managed with medication  -Which multivitamin product are you taking? Flintstones   -What dose and how frequently are you taking calcium citrate? 3 times daily  - from any iron-containing multivitamin by 2 hours?  N/A  -Ulcer risk exposures:   NSAID No  Tobacco No  Alcohol No  Steroids No  -Minutes of physical activity and what type? 5 days weekly, 1 hour, jogging on treadmill, elliptical exercises

## 2025-06-12 NOTE — TELEPHONE ENCOUNTER
Patient requesting to speak with Rebecca Owen NP. Patient states was seen 11/23/22. Patient states was rx'd phentermine. Patient states does not feel medication is working. Patient states please call [phone]. no

## 2025-06-23 ENCOUNTER — OFFICE VISIT (OUTPATIENT)
Age: 45
End: 2025-06-23
Payer: COMMERCIAL

## 2025-06-23 VITALS
DIASTOLIC BLOOD PRESSURE: 98 MMHG | RESPIRATION RATE: 14 BRPM | BODY MASS INDEX: 27.48 KG/M2 | HEART RATE: 86 BPM | TEMPERATURE: 98.1 F | SYSTOLIC BLOOD PRESSURE: 152 MMHG | OXYGEN SATURATION: 100 % | WEIGHT: 171 LBS | HEIGHT: 66 IN

## 2025-06-23 DIAGNOSIS — Z86.39 HX OF OBESITY: ICD-10-CM

## 2025-06-23 DIAGNOSIS — E66.9 OBESITY (BMI 30-39.9): ICD-10-CM

## 2025-06-23 DIAGNOSIS — Z79.899 FOLLOW-UP ENCOUNTER INVOLVING MEDICATION: Primary | ICD-10-CM

## 2025-06-23 DIAGNOSIS — Z98.84 S/P GASTRIC BYPASS: ICD-10-CM

## 2025-06-23 DIAGNOSIS — Z71.3 ENCOUNTER FOR WEIGHT LOSS COUNSELING: ICD-10-CM

## 2025-06-23 PROCEDURE — 99214 OFFICE O/P EST MOD 30 MIN: CPT | Performed by: REGISTERED NURSE

## 2025-06-23 NOTE — PROGRESS NOTES
Bariatric Postoperative Progress Note    Chief Complaint   Patient presents with    Follow-up     MWL (Phentermine)/SWL (LGBP, 8/4/21)     Melida Del Castillo is a 44 y.o. female status post laparoscopic gastric bypass surgery performed on 8/4/21.    MWL follow up. (Restart) Phentermine month #2. +20 lbs. Previously on Wegovy, >10% TBWL. Pleased with results, was at goal weight. But medication no longer covered by insurance. Remains consistent with diet and exercise efforts but experiencing weight regain. Continues to see therapist routinely to help with symptoms of emotional eating.     See nurse note for additional subjective information.     Last Surgical Weight Loss:      6/23/2025     9:49 AM 6/23/2025     8:52 AM 6/23/2025     8:51 AM 6/23/2025     8:50 AM 6/23/2025     8:49 AM 6/23/2025     8:48 AM 6/23/2025     8:47 AM   Surgical Weight Loss Tracker   Date 6/23/2025 4/28/2025 8/4/2022 2/3/2022 11/4/2021 8/11/2021 7/15/2021   Visit Type  Follow Up Visit Follow Up Visit Follow Up Visit Follow Up Visit Follow Up Visit  Preop Visit   Height 5' 6\" 5' 6\" 5' 6\" 5' 6\" 5' 6\" 5' 6\" 5' 6\"   Initial Weight 325 lb 325 lb 325 lb 325 lb 325 lb 325 lb 325 lb   Initial BMI 52.5 52.5 52.5 52.5 52.5 52.5 52.5   Ideal Body Weight 134 lb 134 lb 134 lb 134 lb 134 lb 134 lb 134 lb   Initial Excess Body Weight (EBW) 191 lb 191 lb 191 lb 191 lb 191 lb 191 lb 191 lb   Surgery Date      8/4/2021    Pre-Surgical Weight 317 lb 317 lb 317 lb 317 lb 317 lb 317 lb 317 lb   Pre Surgery BMI 51.2 51.2 51.2 51.2 51.2 51.2 51.2   Preop Weight Change 8 lb 8 lb 8 lb 8 lb 8 lb 8 lb 8 lb   Preop % Weight Change 2% 2% 2% 2% 2% 2% 2%   Pre-Surgical EBW 11 lb 7 oz 11 lb 7 oz 11 lb 7 oz 11 lb 7 oz 11 lb 7 oz 11 lb 7 oz 11 lb 7 oz   Date 6/23/2025 4/28/2025 8/4/2022 2/3/2022 11/4/2021 8/11/2021    Weight 171 lb 151 lb 213 lb 234 lb 256 lb 300 lb    BMI 27.6 24.4 34.4 37.8 41.3 48.4    Wt Change Since Last Visit 20 lb -62 lb -21 lb -22 lb -44 lb 149 lb

## 2025-06-23 NOTE — PROGRESS NOTES
Bariatric Postoperative Nurse Note    Melida Del Castillo is a 44 y.o. female status post laparoscopic gastric bypass surgery performed on 8/4/21.    All Post-Ops (including two weeks)  -# of grams of protein daily? Unsure  -sources of protein? Unsure  -# of oz of sugar free fluids from all sources daily? 100 oz  -Nausea? No  -Vomiting? No  -Difficulty swallow/food sticking? No  -Heartburn/regurgitation? No  -Character of bowel movements (diarrhea/constipation/bloody stools?) alternating between constipation and regularity  -Which multivitamin product are you taking? Flintstones   -What dose and how frequently are you taking calcium citrate? Twice daily  - from any iron-containing multivitamin by 2 hours? Yes  -Ulcer risk exposures:   NSAID No  Tobacco No  Alcohol No  Steroids No  -Minutes of physical activity and what type? Cardio exercises 5 days weekly.

## 2025-08-15 ENCOUNTER — TELEMEDICINE (OUTPATIENT)
Age: 45
End: 2025-08-15
Payer: COMMERCIAL

## 2025-08-15 DIAGNOSIS — Z71.3 ENCOUNTER FOR WEIGHT LOSS COUNSELING: ICD-10-CM

## 2025-08-15 DIAGNOSIS — Z98.84 S/P GASTRIC BYPASS: ICD-10-CM

## 2025-08-15 DIAGNOSIS — Z79.899 FOLLOW-UP ENCOUNTER INVOLVING MEDICATION: ICD-10-CM

## 2025-08-15 DIAGNOSIS — E66.9 OBESITY (BMI 30-39.9): Primary | ICD-10-CM

## 2025-08-15 PROCEDURE — 99214 OFFICE O/P EST MOD 30 MIN: CPT | Performed by: REGISTERED NURSE

## 2025-09-02 DIAGNOSIS — E66.9 OBESITY (BMI 30-39.9): ICD-10-CM

## 2025-09-02 RX ORDER — TIRZEPATIDE 5 MG/.5ML
INJECTION, SOLUTION SUBCUTANEOUS
Qty: 2 ML | Refills: 0 | Status: SHIPPED | OUTPATIENT
Start: 2025-09-02

## (undated) DEVICE — SET SUCT IRR TIP DISP STRYKEFLOW2

## (undated) DEVICE — STAPLE INT WHT BLU G GRN BLK REINF FOR ENDOPATH ECHELON FLX

## (undated) DEVICE — GARMENT,MEDLINE,DVT,SEQUENTIAL,CALF,MD: Brand: MEDLINE

## (undated) DEVICE — TRUE CONTENT TO BE POPULATED AS PART OF REBRANDING: Brand: ARGYLE

## (undated) DEVICE — BLANKET WRM W29.9XL79.1IN UP BODY FORC AIR MISTRAL-AIR

## (undated) DEVICE — TROCAR LAP L100MM DIA5MM BLDELSS W/ STBL SL ENDOPATH XCEL

## (undated) DEVICE — SHEAR HARMONIC ACET 5MMX36CM -- ACE PLUS

## (undated) DEVICE — PACK PROCEDURE SURG LAPAROSCOPY 17X7 MM BRTRC PRIMUS

## (undated) DEVICE — SUTURE VCRL SZ 2-0 L54IN ABSRB VLT W/O NDL POLYGLACTIN 910 J618H

## (undated) DEVICE — REM POLYHESIVE ADULT PATIENT RETURN ELECTRODE: Brand: VALLEYLAB

## (undated) DEVICE — RELOAD STPL L60MM H1.5-3.6MM REG TISS BLU GRIPPING SURF B

## (undated) DEVICE — INTENDED FOR TISSUE SEPARATION, AND OTHER PROCEDURES THAT REQUIRE A SHARP SURGICAL BLADE TO PUNCTURE OR CUT.: Brand: BARD-PARKER SAFETY BLADES SIZE 11, STERILE

## (undated) DEVICE — TROCAR ENDOSCP BLDELSS 12X100 MM W/ HNDL STBL SL OPT TIP

## (undated) DEVICE — 3M™ STERI-STRIP™ COMPOUND BENZOIN TINCTURE 40 BAGS/CARTON 4 CARTONS/CASE C1544: Brand: 3M™ STERI-STRIP™

## (undated) DEVICE — GLOVE SURG SZ 7 L11.33IN FNGR THK9.8MIL STRW LTX POLYMER

## (undated) DEVICE — GAUZE SPONGES,8 PLY: Brand: CURITY

## (undated) DEVICE — TAPE ADH W3INXL10YD PLAS TRNSPAR H2O RESIST HYPOALRG CURAD

## (undated) DEVICE — RELOAD STPL L60MM H1-2.6MM MESENTERY THN TISS WHT 6 ROW

## (undated) DEVICE — STAPLER SKIN L440MM 32MM LNG 12 FIRING B FRM PWR + GRIPPING

## (undated) DEVICE — 4-PORT MANIFOLD: Brand: NEPTUNE 2

## (undated) DEVICE — SUT SLK 2-0SH 30IN BLK --

## (undated) DEVICE — SOLUTION IV 1000ML 0.9% SOD CHL

## (undated) DEVICE — SPIROMETER INCENT 2500ML W ONE W VLV

## (undated) DEVICE — BLANKET WRM AD W50XL85.8IN PACU FULL BODY FORC AIR

## (undated) DEVICE — TROCAR ENDOSCP L100MM DIA12MM STBL SL BLDELSS ENDOPATH XCEL

## (undated) DEVICE — STAPLER SKIN LN REINF 60 MM ECHELON ENDOPATH

## (undated) DEVICE — SOFT SILICONE HYDROCELLULAR SACRUM DRESSING WITH LOCK AWAY LAYER: Brand: ALLEVYN LIFE SACRUM (LARGE) PACK OF 10

## (undated) DEVICE — TROCAR ENDOSCP SHFT L100MM DIA12MM INTEGR STBL ENDOPATH

## (undated) DEVICE — SUTURE VCRL SZ 3-0 L27IN ABSRB VLT L26MM SH 1/2 CIR J316H

## (undated) DEVICE — STRIP,CLOSURE,WOUND,MEDI-STRIP,1/2X4: Brand: MEDLINE

## (undated) DEVICE — SCISSORS ENDOSCP DIA5MM CRV MPLR CAUT W/ RATCH HNDL

## (undated) DEVICE — SUTURE MCRYL SZ 4-0 L27IN ABSRB UD L24MM PS-1 3/8 CIR PRIM Y935H

## (undated) DEVICE — HANDLE PRB DIA5MM HND CTRL PSTL GRP ENDOPATH PRB + II

## (undated) DEVICE — COVER,LIGHT HANDLE,FLX,1/PK: Brand: MEDLINE INDUSTRIES, INC.